# Patient Record
Sex: FEMALE | Race: OTHER | Employment: FULL TIME | URBAN - METROPOLITAN AREA
[De-identification: names, ages, dates, MRNs, and addresses within clinical notes are randomized per-mention and may not be internally consistent; named-entity substitution may affect disease eponyms.]

---

## 2017-05-30 ENCOUNTER — TELEPHONE (OUTPATIENT)
Dept: OBGYN CLINIC | Facility: CLINIC | Age: 37
End: 2017-05-30

## 2017-06-14 ENCOUNTER — OFFICE VISIT (OUTPATIENT)
Dept: OBGYN CLINIC | Facility: CLINIC | Age: 37
End: 2017-06-14

## 2017-06-14 ENCOUNTER — NURSE ONLY (OUTPATIENT)
Dept: OBGYN CLINIC | Facility: CLINIC | Age: 37
End: 2017-06-14

## 2017-06-14 ENCOUNTER — TELEPHONE (OUTPATIENT)
Dept: OBGYN CLINIC | Facility: CLINIC | Age: 37
End: 2017-06-14

## 2017-06-14 ENCOUNTER — APPOINTMENT (OUTPATIENT)
Dept: LAB | Facility: HOSPITAL | Age: 37
End: 2017-06-14
Attending: OBSTETRICS & GYNECOLOGY
Payer: COMMERCIAL

## 2017-06-14 VITALS
WEIGHT: 133.5 LBS | BODY MASS INDEX: 22.79 KG/M2 | HEART RATE: 76 BPM | SYSTOLIC BLOOD PRESSURE: 114 MMHG | HEIGHT: 64 IN | DIASTOLIC BLOOD PRESSURE: 77 MMHG

## 2017-06-14 VITALS — DIASTOLIC BLOOD PRESSURE: 72 MMHG | SYSTOLIC BLOOD PRESSURE: 114 MMHG

## 2017-06-14 DIAGNOSIS — O20.0 THREATENED ABORTION: ICD-10-CM

## 2017-06-14 DIAGNOSIS — N93.9 VAGINAL SPOTTING: ICD-10-CM

## 2017-06-14 DIAGNOSIS — Z67.91 RH NEGATIVE STATE IN ANTEPARTUM PERIOD, FIRST TRIMESTER: ICD-10-CM

## 2017-06-14 DIAGNOSIS — N89.8 VAGINAL ITCHING: Primary | ICD-10-CM

## 2017-06-14 DIAGNOSIS — O20.0 THREATENED ABORTION: Primary | ICD-10-CM

## 2017-06-14 DIAGNOSIS — O26.891 RH NEGATIVE STATE IN ANTEPARTUM PERIOD, FIRST TRIMESTER: ICD-10-CM

## 2017-06-14 DIAGNOSIS — N92.6 MISSED MENSES: ICD-10-CM

## 2017-06-14 DIAGNOSIS — N92.6 MISSED MENSES: Primary | ICD-10-CM

## 2017-06-14 PROBLEM — O26.899 RH NEGATIVE STATE IN ANTEPARTUM PERIOD (HCC): Status: ACTIVE | Noted: 2017-06-14

## 2017-06-14 PROBLEM — O26.899 RH NEGATIVE STATE IN ANTEPARTUM PERIOD: Status: ACTIVE | Noted: 2017-06-14

## 2017-06-14 PROCEDURE — 96372 THER/PROPH/DIAG INJ SC/IM: CPT | Performed by: OBSTETRICS & GYNECOLOGY

## 2017-06-14 PROCEDURE — 36415 COLL VENOUS BLD VENIPUNCTURE: CPT

## 2017-06-14 PROCEDURE — 86901 BLOOD TYPING SEROLOGIC RH(D): CPT

## 2017-06-14 PROCEDURE — 86850 RBC ANTIBODY SCREEN: CPT

## 2017-06-14 PROCEDURE — 86900 BLOOD TYPING SEROLOGIC ABO: CPT

## 2017-06-14 PROCEDURE — 99213 OFFICE O/P EST LOW 20 MIN: CPT | Performed by: ADVANCED PRACTICE MIDWIFE

## 2017-06-14 PROCEDURE — 99204 OFFICE O/P NEW MOD 45 MIN: CPT | Performed by: OBSTETRICS & GYNECOLOGY

## 2017-06-14 RX ORDER — CHOLECALCIFEROL (VITAMIN D3) 1250 MCG
CAPSULE ORAL
Refills: 3 | COMMUNITY
Start: 2017-04-24 | End: 2017-09-28

## 2017-06-14 RX ORDER — FOLIC ACID 1 MG/1
TABLET ORAL
Refills: 10 | COMMUNITY
Start: 2017-04-24 | End: 2019-07-02 | Stop reason: ALTCHOICE

## 2017-06-14 NOTE — PROGRESS NOTES
S.  Had some bleeding in early pregnancy. Was seen earlier by Dr. Khushboo Du and forgot to tell him she has a vaginal itch  O.  Dark brownish red small amount d/c  A. Vaginal itch  P.  Vaginal culture  Not to put anything in vagina at this time.   Can use m

## 2017-06-14 NOTE — TELEPHONE ENCOUNTER
Pt needs to schedule Nurse Ed appt in 1 week. No afternoon appts open for next week for Nurse ed.  Pt states she cannot come in any time earlier than 3pm.

## 2017-06-14 NOTE — PROGRESS NOTES
HPI:   Marilin Ruiz is a 39year old female who presents for amenorrhea/missed menses/pcv/vaginal spotting yesterday, red, today just scant brown. Counseled on threatend ab, check u/s. Rh negative per pt, counseled, rhophylac w/u today. tenderness  :declined pelvic exam by pt.      MUSCULOSKELETAL: back is not tender,FROM of the back  EXTREMITIES: no cyanosis, clubbing or edema  NEURO: Oriented times three,    ASSESSMENT AND PLAN:   Choco Obrien is a 39year old female who p

## 2017-06-14 NOTE — PATIENT INSTRUCTIONS
If You Are Rh Negative – Non Routine Administration  If you’re Rh negative, ask your health care provider about getting treated with RhoGam or Rhophylac. Even if you miscarry or don’t deliver the baby, you will still need treatment.  The health of any bab o Blood test to check for blood type and Rh factor at an [address] [facility] (Diagn[facility] or [facility])  o RhoGam or Rhophylac injection same day as directed by your provider    Location, date and time:  [facility] G[place] 06/14/2017 at 3:50p

## 2017-06-15 ENCOUNTER — HOSPITAL ENCOUNTER (OUTPATIENT)
Dept: ULTRASOUND IMAGING | Facility: HOSPITAL | Age: 37
Discharge: HOME | End: 2017-06-15
Attending: OBSTETRICS & GYNECOLOGY
Payer: COMMERCIAL

## 2017-06-15 DIAGNOSIS — N92.6 MISSED MENSES: ICD-10-CM

## 2017-06-15 DIAGNOSIS — N93.9 VAGINAL SPOTTING: ICD-10-CM

## 2017-06-15 PROCEDURE — 76801 OB US < 14 WKS SINGLE FETUS: CPT | Performed by: OBSTETRICS & GYNECOLOGY

## 2017-06-22 ENCOUNTER — NURSE ONLY (OUTPATIENT)
Dept: OBGYN CLINIC | Facility: CLINIC | Age: 37
End: 2017-06-22

## 2017-06-22 DIAGNOSIS — Z34.81 OTHER NORMAL PREGNANCY, NOT FIRST, FIRST TRIMESTER: Primary | ICD-10-CM

## 2017-06-22 RX ORDER — PRENATAL VIT/IRON FUM/FOLIC AC 27MG-0.8MG
1 TABLET ORAL DAILY
COMMUNITY
End: 2019-07-02 | Stop reason: ALTCHOICE

## 2017-06-22 NOTE — PROGRESS NOTES
Pt and spouce here today for OB RN  Education visit. Pt currently 8w4d by LMP. Educational material reviewed including diet, exercise, travel, precautions. Pt verbalized understanding. PN labs ordered. Undecided on genetic testing to contact office.    32

## 2017-07-03 ENCOUNTER — TELEPHONE (OUTPATIENT)
Dept: OBGYN CLINIC | Facility: CLINIC | Age: 37
End: 2017-07-03

## 2017-07-03 NOTE — TELEPHONE ENCOUNTER
Message   Received: 4 days ago   2 Francis Calvert MD  [facility]Gyne Clinical Staff             edc 1/23/18 by u/s

## 2017-07-15 LAB
ABSOLUTE BASOPHILS: 26 CELLS/UL (ref 0–200)
ABSOLUTE EOSINOPHILS: 43 CELLS/UL (ref 15–500)
ABSOLUTE LYMPHOCYTES: 1677 CELLS/UL (ref 850–3900)
ABSOLUTE MONOCYTES: 482 CELLS/UL (ref 200–950)
ABSOLUTE NEUTROPHILS: 6373 CELLS/UL (ref 1500–7800)
ANTIBODY SCREEN, RBC W/REFL ID, TITER AND AG: POSITIVE
BASOPHILS: 0.3 %
EOSINOPHILS: 0.5 %
GLUCOSE, GESTATIONAL SCREEN (50G)-130 CUTOFF: 111 MG/DL
HEMATOCRIT: 38.6 % (ref 35–45)
HEMOGLOBIN: 13.2 G/DL (ref 11.7–15.5)
LYMPHOCYTES: 19.5 %
MCH: 29.7 PG (ref 27–33)
MCHC: 34.2 G/DL (ref 32–36)
MCV: 86.9 FL (ref 80–100)
MONOCYTES: 5.6 %
MPV: 11.4 FL (ref 7.5–12.5)
NEUTROPHILS: 74.1 %
PLATELET COUNT: 289 THOUSAND/UL (ref 140–400)
RDW: 12.2 % (ref 11–15)
RED BLOOD CELL COUNT: 4.44 MILLION/UL (ref 3.8–5.1)
RUBELLA ANTIBODY (IGG): 3.81 INDEX
WHITE BLOOD CELL COUNT: 8.6 THOUSAND/UL (ref 3.8–10.8)

## 2017-07-27 ENCOUNTER — INITIAL PRENATAL (OUTPATIENT)
Dept: OBGYN CLINIC | Facility: CLINIC | Age: 37
End: 2017-07-27

## 2017-07-27 VITALS
DIASTOLIC BLOOD PRESSURE: 67 MMHG | SYSTOLIC BLOOD PRESSURE: 102 MMHG | WEIGHT: 139.19 LBS | BODY MASS INDEX: 24 KG/M2 | HEART RATE: 85 BPM

## 2017-07-27 DIAGNOSIS — Z34.81 ENCOUNTER FOR SUPERVISION OF OTHER NORMAL PREGNANCY IN FIRST TRIMESTER: Primary | ICD-10-CM

## 2017-07-27 LAB
APPEARANCE: CLEAR
MULTISTIX LOT#: NORMAL NUMERIC
PH, URINE: 6 (ref 4.5–8)
SPECIFIC GRAVITY: 1.03 (ref 1–1.03)
URINE-COLOR: YELLOW
UROBILINOGEN,SEMI-QN: 0.2 MG/DL (ref 0–1.9)

## 2017-08-23 ENCOUNTER — ROUTINE PRENATAL (OUTPATIENT)
Dept: OBGYN CLINIC | Facility: CLINIC | Age: 37
End: 2017-08-23

## 2017-08-23 ENCOUNTER — TELEPHONE (OUTPATIENT)
Dept: OBGYN CLINIC | Facility: CLINIC | Age: 37
End: 2017-08-23

## 2017-08-23 VITALS — SYSTOLIC BLOOD PRESSURE: 122 MMHG | DIASTOLIC BLOOD PRESSURE: 70 MMHG | BODY MASS INDEX: 24 KG/M2 | WEIGHT: 141.38 LBS

## 2017-08-23 DIAGNOSIS — Z34.82 ENCOUNTER FOR SUPERVISION OF OTHER NORMAL PREGNANCY IN SECOND TRIMESTER: Primary | ICD-10-CM

## 2017-08-23 PROBLEM — O09.529 AMA (ADVANCED MATERNAL AGE) MULTIGRAVIDA 35+: Status: ACTIVE | Noted: 2017-08-23

## 2017-08-23 PROBLEM — O09.529 AMA (ADVANCED MATERNAL AGE) MULTIGRAVIDA 35+ (HCC): Status: ACTIVE | Noted: 2017-08-23

## 2017-08-23 LAB
APPEARANCE: CLEAR
MULTISTIX LOT#: NORMAL NUMERIC
PH, URINE: 6 (ref 4.5–8)
SPECIFIC GRAVITY: 1.02 (ref 1–1.03)
URINE-COLOR: YELLOW
UROBILINOGEN,SEMI-QN: 0.2 MG/DL (ref 0–1.9)

## 2017-08-23 NOTE — PROGRESS NOTES
No complaints. Has felt very little fetal movement as of yet. Denies abdominal pain. Is eating and drinking better.   We will be calling to schedule her level 2 ultrasound with [facility] for 20 weeks  Declines genetic testing

## 2017-08-23 NOTE — TELEPHONE ENCOUNTER
Please see that patient has orders for level 2 ultrasound for AMA with MFM for 20 weeks. Patient says she has not been called and she will be calling soon.

## 2017-09-08 ENCOUNTER — HOSPITAL ENCOUNTER (OUTPATIENT)
Dept: PERINATAL CARE | Facility: HOSPITAL | Age: 37
Discharge: HOME | End: 2017-09-08
Attending: OBSTETRICS & GYNECOLOGY
Payer: COMMERCIAL

## 2017-09-08 VITALS
HEART RATE: 81 BPM | SYSTOLIC BLOOD PRESSURE: 125 MMHG | RESPIRATION RATE: 16 BRPM | WEIGHT: 141 LBS | HEIGHT: 64 IN | BODY MASS INDEX: 24.07 KG/M2 | DIASTOLIC BLOOD PRESSURE: 60 MMHG

## 2017-09-08 VITALS — HEART RATE: 81 BPM | SYSTOLIC BLOOD PRESSURE: 125 MMHG | DIASTOLIC BLOOD PRESSURE: 60 MMHG

## 2017-09-08 DIAGNOSIS — Z36.3 SCREENING, ANTENATAL, FOR MALFORMATION BY ULTRASOUND: ICD-10-CM

## 2017-09-08 DIAGNOSIS — O09.522 ELDERLY MULTIGRAVIDA IN SECOND TRIMESTER: ICD-10-CM

## 2017-09-08 DIAGNOSIS — O09.522 ELDERLY MULTIGRAVIDA IN SECOND TRIMESTER: Primary | ICD-10-CM

## 2017-09-08 PROCEDURE — 99243 OFF/OP CNSLTJ NEW/EST LOW 30: CPT | Performed by: OBSTETRICS & GYNECOLOGY

## 2017-09-08 PROCEDURE — 76811 OB US DETAILED SNGL FETUS: CPT | Performed by: OBSTETRICS & GYNECOLOGY

## 2017-09-08 NOTE — PROGRESS NOTES
Outpatient Maternal-Fetal Medicine Consultation    Dear Dr. Salima Krishnamurthy,    Thank you for requesting ultrasound evaluation and maternal fetal medicine consultation on your patient Gianna Tan.   As you are aware she is a 39year old female with a Si General: alert and oriented,no acute distress  Abdomen: gravid, soft, non-tender  Extremities: non-tender, no edema      OBSTETRIC ULTRASOUND  A level II ultrasound was performed prior to the consultation which I interpreted and discussed with the patien percent in women over age 36 and is as high as 28 percent in women over age 48. The incidence of gestational diabetes in the general obstetric population is 3 percent, rising to 7 to 15 percent in women over age 36 and 21 percent in women over age 48.   Anurag Valenzuela an ultrasound examination cannot reliably exclude potential genetic abnormalities.  Given that the patient will be 40years old at the time of delivery I reviewed that her risk (at amniocentesis) of having a fetus with any chromosome abnormality is 1:80 and minutes.

## 2017-09-08 NOTE — PROGRESS NOTES
Pt here for Level II ultrasound  AMA  Hx of depression  Denies pregnancy complaints and states feeling fetal movement

## 2017-09-23 ENCOUNTER — ROUTINE PRENATAL (OUTPATIENT)
Dept: OBGYN CLINIC | Facility: CLINIC | Age: 37
End: 2017-09-23

## 2017-09-23 VITALS — WEIGHT: 145 LBS | BODY MASS INDEX: 25 KG/M2 | DIASTOLIC BLOOD PRESSURE: 72 MMHG | SYSTOLIC BLOOD PRESSURE: 112 MMHG

## 2017-09-23 DIAGNOSIS — Z34.82 ENCOUNTER FOR SUPERVISION OF OTHER NORMAL PREGNANCY IN SECOND TRIMESTER: Primary | ICD-10-CM

## 2017-09-23 LAB
APPEARANCE: CLEAR
BILIRUBIN: NEGATIVE
GLUCOSE (URINE DIPSTICK): NEGATIVE MG/DL
KETONES (URINE DIPSTICK): NEGATIVE MG/DL
LEUKOCYTES: NEGATIVE
MULTISTIX LOT#: NORMAL NUMERIC
NITRITE, URINE: NEGATIVE
OCCULT BLOOD: NEGATIVE
PH, URINE: 6 (ref 4.5–8)
PROTEIN (URINE DIPSTICK): NEGATIVE MG/DL
SPECIFIC GRAVITY: 1.01 (ref 1–1.03)
URINE-COLOR: YELLOW
UROBILINOGEN,SEMI-QN: 0 MG/DL (ref 0–1.9)

## 2017-09-28 ENCOUNTER — TELEPHONE (OUTPATIENT)
Dept: OBGYN CLINIC | Facility: CLINIC | Age: 37
End: 2017-09-28

## 2017-09-28 ENCOUNTER — ROUTINE PRENATAL (OUTPATIENT)
Dept: OBGYN CLINIC | Facility: CLINIC | Age: 37
End: 2017-09-28

## 2017-09-28 VITALS
BODY MASS INDEX: 25 KG/M2 | HEART RATE: 84 BPM | SYSTOLIC BLOOD PRESSURE: 117 MMHG | WEIGHT: 147 LBS | DIASTOLIC BLOOD PRESSURE: 70 MMHG

## 2017-09-28 DIAGNOSIS — Z67.91 RH NEGATIVE STATE IN ANTEPARTUM PERIOD: ICD-10-CM

## 2017-09-28 DIAGNOSIS — O09.529 SUPERVISION OF ELDERLY MULTIGRAVIDA, UNSPECIFIED TRIMESTER: Primary | ICD-10-CM

## 2017-09-28 DIAGNOSIS — O26.899 RH NEGATIVE STATE IN ANTEPARTUM PERIOD: ICD-10-CM

## 2017-09-28 DIAGNOSIS — Z34.82 ENCOUNTER FOR SUPERVISION OF OTHER NORMAL PREGNANCY IN SECOND TRIMESTER: ICD-10-CM

## 2017-09-28 LAB
MULTISTIX LOT#: NORMAL NUMERIC
PH, URINE: 6 (ref 4.5–8)
SPECIFIC GRAVITY: 1.01 (ref 1–1.03)
UROBILINOGEN,SEMI-QN: 0 MG/DL (ref 0–1.9)

## 2017-09-28 NOTE — PROGRESS NOTES
[facility]  Obstetrics and Gynecology  Prenatal Visit  Jimmy Lamar MD    KAREN Sarkar is a 39year old.o.  23w2d weeks. Here for routine prenatal visit and is without complaints.   Patient denies any regular uterine contra are probably not solely related to being pregnant.   I recommended that either she go to the emergency department for evaluation if she feels that she has significant dizziness that impacts her normal daily function or she follow up as soon as possible with

## 2017-09-28 NOTE — TELEPHONE ENCOUNTER
Patient  ga 23, having  Dizzy spells since yesterday with occasional headaches, no blurrie vision, good FM, patient denies any cramping or pain.  States she drinks lots of water, no history of high b/p

## 2017-09-28 NOTE — TELEPHONE ENCOUNTER
Per AJB appointment made with JF to be evaluated, today at 1:10pm at [facility]

## 2017-10-19 ENCOUNTER — ROUTINE PRENATAL (OUTPATIENT)
Dept: OBGYN CLINIC | Facility: CLINIC | Age: 37
End: 2017-10-19

## 2017-10-19 ENCOUNTER — TELEPHONE (OUTPATIENT)
Dept: OBGYN CLINIC | Facility: CLINIC | Age: 37
End: 2017-10-19

## 2017-10-19 VITALS
BODY MASS INDEX: 26 KG/M2 | WEIGHT: 154 LBS | SYSTOLIC BLOOD PRESSURE: 119 MMHG | DIASTOLIC BLOOD PRESSURE: 70 MMHG | HEART RATE: 92 BPM

## 2017-10-19 DIAGNOSIS — Z67.91 RH NEGATIVE STATE IN ANTEPARTUM PERIOD: ICD-10-CM

## 2017-10-19 DIAGNOSIS — O26.899 RH NEGATIVE STATE IN ANTEPARTUM PERIOD: ICD-10-CM

## 2017-10-19 DIAGNOSIS — O09.529 SUPERVISION OF ELDERLY MULTIGRAVIDA, UNSPECIFIED TRIMESTER: Primary | ICD-10-CM

## 2017-10-19 DIAGNOSIS — Z34.82 ENCOUNTER FOR SUPERVISION OF OTHER NORMAL PREGNANCY IN SECOND TRIMESTER: ICD-10-CM

## 2017-10-19 PROBLEM — Z20.821 ZIKA VIRUS EXPOSURE AFFECTING PREGNANCY: Status: ACTIVE | Noted: 2017-10-19

## 2017-10-19 PROBLEM — Z20.821 ZIKA VIRUS EXPOSURE AFFECTING PREGNANCY (HCC): Status: ACTIVE | Noted: 2017-10-19

## 2017-10-19 PROBLEM — O99.891 ZIKA VIRUS EXPOSURE AFFECTING PREGNANCY: Status: ACTIVE | Noted: 2017-10-19

## 2017-10-19 PROBLEM — O99.891 ZIKA VIRUS EXPOSURE AFFECTING PREGNANCY (HCC): Status: ACTIVE | Noted: 2017-10-19

## 2017-10-19 NOTE — PROGRESS NOTES
After discussing flu vaccine with her  and prior to nurse giving patient vaccine and she changed her mind. She declined flu vaccine and understands our recommendations during pregnancy.

## 2017-10-19 NOTE — TELEPHONE ENCOUNTER
10/19/17 Pr Dr. Adam Noriega pt needs Rhogam shot in 2wks. Pt needs to scheduled.         Please Advs. RN

## 2017-10-19 NOTE — PROGRESS NOTES
[facility]  Obstetrics and Gynecology  Prenatal Visit  Jimmy Lamar MD    KAREN Sarkar is a 39year old.o.  26w2d weeks. Here for routine prenatal visit and is without complaints.   Patient denies any regular uterine contra Podosi. I discussed risk of certain birth defects with Zika virus during pregnancy. Neither patient nor her  experience any illness during or after their trip.   I encouraged him to use condoms for prevention of transmission from  to patient

## 2017-11-01 ENCOUNTER — TELEPHONE (OUTPATIENT)
Dept: OBGYN CLINIC | Facility: CLINIC | Age: 37
End: 2017-11-01

## 2017-11-01 DIAGNOSIS — R73.09 ABNORMAL GTT (GLUCOSE TOLERANCE TEST): Primary | ICD-10-CM

## 2017-11-01 RX ORDER — FERROUS SULFATE 325(65) MG
325 TABLET ORAL
Qty: 30 TABLET | Refills: 3 | OUTPATIENT
Start: 2017-11-01 | End: 2019-07-02 | Stop reason: ALTCHOICE

## 2017-11-01 NOTE — TELEPHONE ENCOUNTER
Pt informed of elevated glucose, mild anemia and need to do take iron and do 3 hr gtt. Central scheduling number provided. Called iron in to patient's preferred, no receipt of medication obtained.

## 2017-11-01 NOTE — TELEPHONE ENCOUNTER
----- Message from Stella Ortiz MD sent at 11/1/2017 11:36 AM CDT -----  Please notify patient of elevated 50 gram glucola and schedule her for a 3 hour GTT screen. In addition, please notify patient of mild anemia.   She should begin ferrous sulfate

## 2017-11-03 ENCOUNTER — LAB ENCOUNTER (OUTPATIENT)
Dept: LAB | Age: 37
End: 2017-11-03
Attending: OBSTETRICS & GYNECOLOGY
Payer: COMMERCIAL

## 2017-11-03 DIAGNOSIS — R73.09 ABNORMAL GTT (GLUCOSE TOLERANCE TEST): ICD-10-CM

## 2017-11-03 PROCEDURE — 36415 COLL VENOUS BLD VENIPUNCTURE: CPT | Performed by: OBSTETRICS & GYNECOLOGY

## 2017-11-03 PROCEDURE — 86901 BLOOD TYPING SEROLOGIC RH(D): CPT | Performed by: OBSTETRICS & GYNECOLOGY

## 2017-11-03 PROCEDURE — 85027 COMPLETE CBC AUTOMATED: CPT | Performed by: OBSTETRICS & GYNECOLOGY

## 2017-11-03 PROCEDURE — 86900 BLOOD TYPING SEROLOGIC ABO: CPT | Performed by: OBSTETRICS & GYNECOLOGY

## 2017-11-03 PROCEDURE — 86850 RBC ANTIBODY SCREEN: CPT | Performed by: OBSTETRICS & GYNECOLOGY

## 2017-11-03 PROCEDURE — 82952 GTT-ADDED SAMPLES: CPT

## 2017-11-03 PROCEDURE — 85007 BL SMEAR W/DIFF WBC COUNT: CPT | Performed by: OBSTETRICS & GYNECOLOGY

## 2017-11-03 PROCEDURE — 36415 COLL VENOUS BLD VENIPUNCTURE: CPT

## 2017-11-03 PROCEDURE — 85025 COMPLETE CBC W/AUTO DIFF WBC: CPT | Performed by: OBSTETRICS & GYNECOLOGY

## 2017-11-03 PROCEDURE — 82951 GLUCOSE TOLERANCE TEST (GTT): CPT

## 2017-11-07 ENCOUNTER — ROUTINE PRENATAL (OUTPATIENT)
Dept: OBGYN CLINIC | Facility: CLINIC | Age: 37
End: 2017-11-07

## 2017-11-07 ENCOUNTER — TELEPHONE (OUTPATIENT)
Dept: OBGYN CLINIC | Facility: CLINIC | Age: 37
End: 2017-11-07

## 2017-11-07 VITALS
DIASTOLIC BLOOD PRESSURE: 70 MMHG | WEIGHT: 156.63 LBS | SYSTOLIC BLOOD PRESSURE: 104 MMHG | BODY MASS INDEX: 27 KG/M2 | HEART RATE: 85 BPM

## 2017-11-07 DIAGNOSIS — Z23 NEED FOR VACCINATION: ICD-10-CM

## 2017-11-07 DIAGNOSIS — B34.9 VIRAL SYNDROME: Primary | ICD-10-CM

## 2017-11-07 DIAGNOSIS — O26.893 RH NEGATIVE STATE IN ANTEPARTUM PERIOD, THIRD TRIMESTER: ICD-10-CM

## 2017-11-07 DIAGNOSIS — Z67.91 RH NEGATIVE STATE IN ANTEPARTUM PERIOD, THIRD TRIMESTER: ICD-10-CM

## 2017-11-07 DIAGNOSIS — Z34.83 ENCOUNTER FOR SUPERVISION OF OTHER NORMAL PREGNANCY IN THIRD TRIMESTER: Primary | ICD-10-CM

## 2017-11-07 PROCEDURE — 90686 IIV4 VACC NO PRSV 0.5 ML IM: CPT | Performed by: ADVANCED PRACTICE MIDWIFE

## 2017-11-07 PROCEDURE — 96372 THER/PROPH/DIAG INJ SC/IM: CPT | Performed by: ADVANCED PRACTICE MIDWIFE

## 2017-11-07 PROCEDURE — 90471 IMMUNIZATION ADMIN: CPT | Performed by: ADVANCED PRACTICE MIDWIFE

## 2017-11-07 NOTE — PATIENT INSTRUCTIONS
If You Are Rh Negative – Routine Administration    If you’re Rh negative, ask your health care provider about getting treated with RhoGam or Rhophylac. Even if you miscarry or don’t deliver the baby, you will still need treatment.  The health of any baby as directed      Location, date and time:

## 2017-11-07 NOTE — PROGRESS NOTES
S.  Denies ABBASI, vision change, URQ pain, swelling. Baby is active. Its a girl. EDPS. Traveled to [facility] in October. Reports one mosquito bite but no sx of zika. Supervisor at work, [employer].   O.  See above  A.   S = D 29 weeks  Kieran Marcano

## 2017-11-08 NOTE — PROGRESS NOTES
Rhogam 1500 iu IM administered to pt in the left upper gluteus. Flulaval 0.5 ml IM adm to pt in the left deltoid. Pt tolerated both injections well with no adverse effects.

## 2017-11-14 ENCOUNTER — TELEPHONE (OUTPATIENT)
Dept: PEDIATRICS CLINIC | Facility: CLINIC | Age: 37
End: 2017-11-14

## 2017-11-15 ENCOUNTER — OFFICE VISIT (OUTPATIENT)
Dept: OBGYN CLINIC | Facility: CLINIC | Age: 37
End: 2017-11-15

## 2017-11-15 VITALS
DIASTOLIC BLOOD PRESSURE: 62 MMHG | SYSTOLIC BLOOD PRESSURE: 108 MMHG | WEIGHT: 158 LBS | TEMPERATURE: 98 F | BODY MASS INDEX: 27 KG/M2

## 2017-11-15 DIAGNOSIS — R05.9 COUGH: ICD-10-CM

## 2017-11-15 DIAGNOSIS — J02.9 SORE THROAT: Primary | ICD-10-CM

## 2017-11-15 DIAGNOSIS — J02.9 PHARYNGITIS, UNSPECIFIED ETIOLOGY: ICD-10-CM

## 2017-11-15 PROCEDURE — 99214 OFFICE O/P EST MOD 30 MIN: CPT | Performed by: OBSTETRICS & GYNECOLOGY

## 2017-11-15 NOTE — TELEPHONE ENCOUNTER
Pt voices since yesterday she has had a sore throat. No fever. Positive fetal movement and no contractions or pain. Pt voices she has some nasal congestion.  Informed pt to review sheet of acceptable medications to take in pregnancy to see if there is anyth

## 2017-11-18 NOTE — PROGRESS NOTES
HPI:   Lyndsay Lopez is a 39year old female who presents for a hx of sore throat for 3 days, pt requested strep testing.        Wt Readings from Last 6 Encounters:  11/15/17 : 158 lb (71.7 kg)  11/07/17 : 156 lb 9.6 oz (71 kg)  10/19/17 : 154 lb developed, well nourished,in no apparent distress  SKIN: no rashes,no suspicious lesions  HEENT: atraumatic, normocephalic,ears and throat are clear with very mild erythema oropharynx, no plaques.     NECK: supple,no adenopathy  CHEST: no chest tenderness

## 2017-11-20 ENCOUNTER — TELEPHONE (OUTPATIENT)
Dept: OBGYN CLINIC | Facility: CLINIC | Age: 37
End: 2017-11-20

## 2017-11-20 ENCOUNTER — LAB ENCOUNTER (OUTPATIENT)
Dept: LAB | Age: 37
End: 2017-11-20
Attending: ADVANCED PRACTICE MIDWIFE
Payer: COMMERCIAL

## 2017-11-20 ENCOUNTER — ROUTINE PRENATAL (OUTPATIENT)
Dept: OBGYN CLINIC | Facility: CLINIC | Age: 37
End: 2017-11-20

## 2017-11-20 VITALS — BODY MASS INDEX: 27 KG/M2 | WEIGHT: 159 LBS | DIASTOLIC BLOOD PRESSURE: 60 MMHG | SYSTOLIC BLOOD PRESSURE: 102 MMHG

## 2017-11-20 DIAGNOSIS — B34.9 VIRAL SYNDROME: ICD-10-CM

## 2017-11-20 DIAGNOSIS — Z34.83 ENCOUNTER FOR SUPERVISION OF OTHER NORMAL PREGNANCY IN THIRD TRIMESTER: ICD-10-CM

## 2017-11-20 DIAGNOSIS — Z23 NEED FOR VACCINATION: ICD-10-CM

## 2017-11-20 DIAGNOSIS — Z34.83 ENCOUNTER FOR SUPERVISION OF OTHER NORMAL PREGNANCY IN THIRD TRIMESTER: Primary | ICD-10-CM

## 2017-11-20 PROCEDURE — 86900 BLOOD TYPING SEROLOGIC ABO: CPT

## 2017-11-20 PROCEDURE — 86900 BLOOD TYPING SEROLOGIC ABO: CPT | Performed by: ADVANCED PRACTICE MIDWIFE

## 2017-11-20 PROCEDURE — 86901 BLOOD TYPING SEROLOGIC RH(D): CPT | Performed by: ADVANCED PRACTICE MIDWIFE

## 2017-11-20 PROCEDURE — 86901 BLOOD TYPING SEROLOGIC RH(D): CPT

## 2017-11-20 PROCEDURE — 36415 COLL VENOUS BLD VENIPUNCTURE: CPT | Performed by: ADVANCED PRACTICE MIDWIFE

## 2017-11-20 NOTE — TELEPHONE ENCOUNTER
Pt dropped off FMLA forms at the time of visit, HIPPA & FCR forms completed. Unable to take pymnt due to error w/ CC machine. Forms scanned to [employer] and copy left in the bin for Kori @ [facility].

## 2017-11-20 NOTE — TELEPHONE ENCOUNTER
[phone] (home) [phone] (work)  Pt informed she can go for [facility] testing, order placed in system.

## 2017-11-21 NOTE — TELEPHONE ENCOUNTER
Received in [facility] form + FCR+ Signed release, pt aware of fee, will bill. Logged for processing.  NK

## 2017-11-22 ENCOUNTER — TELEPHONE (OUTPATIENT)
Dept: OBGYN CLINIC | Facility: CLINIC | Age: 37
End: 2017-11-22

## 2017-11-22 ENCOUNTER — HOSPITAL ENCOUNTER (OUTPATIENT)
Facility: HOSPITAL | Age: 37
Setting detail: OBSERVATION
Discharge: HOME | End: 2017-11-22
Attending: OBSTETRICS & GYNECOLOGY | Admitting: OBSTETRICS & GYNECOLOGY
Payer: COMMERCIAL

## 2017-11-22 VITALS — DIASTOLIC BLOOD PRESSURE: 58 MMHG | SYSTOLIC BLOOD PRESSURE: 110 MMHG | HEART RATE: 86 BPM

## 2017-11-22 PROCEDURE — 99212 OFFICE O/P EST SF 10 MIN: CPT | Performed by: OBSTETRICS & GYNECOLOGY

## 2017-11-22 PROCEDURE — 59025 FETAL NON-STRESS TEST: CPT | Performed by: OBSTETRICS & GYNECOLOGY

## 2017-11-22 NOTE — TRIAGE
[facility]      Triage Note    Gianna Manuel Patient Status:  Observation    1980 MRN [id number]   Location [address]  Attending Alverto Garcia MD   Hosp Day # 0 PCP Daquan Glover MD contractions noted. Dr Benitez Chow at bedside and VE done cervix closed, thick and posterior. Orders recd to discharge to home. Pt given instructions to relax and rest this weekend. Pt instructed on pain management and relaxation.   Pt educated on fetal kick

## 2017-11-22 NOTE — H&P
[facility]    Triage Observation History & Physical    Gianna Sanford Che Patient Status:  Observation    1980 MRN [id number]   Location [address]  Attending Lucy Pacheco MD   Hosp Day # 0 PCP E documented in HPI        Physical Exam:   Pulse:  [86] 86  BP: (110)/(58) 110/58    Constitutional: alert, appears stated age and cooperative  Respiratory: clear to auscultation bilaterally  Cardiac: regular rate and rhythm, S1, S2 normal, no murmur, click

## 2017-11-22 NOTE — TELEPHONE ENCOUNTER
Per the pt she is 31 weeks pregnant, and having a lot of lower back pain, and stomach tightening. The pt would like to speak with a nurse. Please advise.

## 2017-11-22 NOTE — PROGRESS NOTES
Pt states she has had constant back pain 5/10 since % am this morning. Pt does states she has noticed increase in frequency of urination and some burning with urination at work but states she has a long way to walk to the bathroom at work.   Abd palpates s

## 2017-11-22 NOTE — TELEPHONE ENCOUNTER
Pt 31 weeks c/o mid-lower back pain since 5 am in the morning. Pain feels like aching and Constat rated 5/10. Increased frequency, also has noticed pressure, urgency x1 week  Also c/o intermittent abdominal tightening tight since Monday.  Hasn't paid atten

## 2017-11-28 NOTE — TELEPHONE ENCOUNTER
Dr. Freedom Oneil    Please sign off on form:  -Highlight the patient and hit \"Chart\" button. -In Chart Review, w/in the Encounter tab - open the Telephone call encounter for 11-20-17. Scroll down.  -Click \"scan on\" blue Hyperlink under \"Media\" heading for [email or web address]_ [id number].  -Click on Acknowledge button at the bottom right corner and left-click onto image, signature stamp appears and drag signature to Provider signature line. Stamp will turn blue. Close window.     Thank you,  Arsh Sweeney

## 2017-11-28 NOTE — TELEPHONE ENCOUNTER
FMLA form complete and ready to be printed. Patient informed by phone form is ready. Patient needs to pay form fee of $25. Request complete.

## 2017-12-01 ENCOUNTER — HOSPITAL ENCOUNTER (OUTPATIENT)
Dept: PERINATAL CARE | Facility: HOSPITAL | Age: 37
Discharge: HOME | End: 2017-12-01
Attending: OBSTETRICS & GYNECOLOGY
Payer: COMMERCIAL

## 2017-12-01 DIAGNOSIS — O99.891 ZIKA VIRUS EXPOSURE AFFECTING PREGNANCY: ICD-10-CM

## 2017-12-01 DIAGNOSIS — Z20.821 ZIKA VIRUS EXPOSURE AFFECTING PREGNANCY: ICD-10-CM

## 2017-12-01 DIAGNOSIS — O09.523 ELDERLY MULTIGRAVIDA IN THIRD TRIMESTER: ICD-10-CM

## 2017-12-01 DIAGNOSIS — O09.523 ELDERLY MULTIGRAVIDA IN THIRD TRIMESTER: Primary | ICD-10-CM

## 2017-12-01 PROCEDURE — 99242 OFF/OP CONSLTJ NEW/EST SF 20: CPT | Performed by: OBSTETRICS & GYNECOLOGY

## 2017-12-01 PROCEDURE — 76805 OB US >/= 14 WKS SNGL FETUS: CPT | Performed by: OBSTETRICS & GYNECOLOGY

## 2017-12-01 NOTE — PROGRESS NOTES
Outpatient Maternal-Fetal Medicine Consultation    Dear Dr. Diamond Goldman,    Thank you for requesting ultrasound evaluation and maternal fetal medicine consultation on your patient Gianna Rey Michelle.   As you are aware she is a 40year old female with a Sin The majority of the time (>50%) was spent in review of records, consultation and coordination of care. Our discussion is summarized above. The approximate physician face-to-face time was 15 minutes.

## 2017-12-07 ENCOUNTER — ROUTINE PRENATAL (OUTPATIENT)
Dept: OBGYN CLINIC | Facility: CLINIC | Age: 37
End: 2017-12-07

## 2017-12-07 VITALS
DIASTOLIC BLOOD PRESSURE: 70 MMHG | WEIGHT: 161 LBS | HEART RATE: 89 BPM | BODY MASS INDEX: 28 KG/M2 | SYSTOLIC BLOOD PRESSURE: 106 MMHG

## 2017-12-07 DIAGNOSIS — Z34.83 ENCOUNTER FOR SUPERVISION OF OTHER NORMAL PREGNANCY IN THIRD TRIMESTER: Primary | ICD-10-CM

## 2017-12-07 NOTE — PROGRESS NOTES
Good fm. No c/o. Had [facility] virus test 11/20, results still pending.   Plan nst starting 36 wks

## 2017-12-21 ENCOUNTER — ROUTINE PRENATAL (OUTPATIENT)
Dept: OBGYN CLINIC | Facility: CLINIC | Age: 37
End: 2017-12-21

## 2017-12-21 ENCOUNTER — TELEPHONE (OUTPATIENT)
Dept: OBGYN CLINIC | Facility: CLINIC | Age: 37
End: 2017-12-21

## 2017-12-21 VITALS — SYSTOLIC BLOOD PRESSURE: 112 MMHG | DIASTOLIC BLOOD PRESSURE: 62 MMHG | WEIGHT: 163.81 LBS | BODY MASS INDEX: 28 KG/M2

## 2017-12-21 DIAGNOSIS — Z34.83 ENCOUNTER FOR SUPERVISION OF OTHER NORMAL PREGNANCY IN THIRD TRIMESTER: Primary | ICD-10-CM

## 2017-12-21 PROCEDURE — 59426 ANTEPARTUM CARE ONLY: CPT | Performed by: OBSTETRICS & GYNECOLOGY

## 2018-01-04 ENCOUNTER — TELEPHONE (OUTPATIENT)
Dept: OBGYN CLINIC | Facility: CLINIC | Age: 38
End: 2018-01-04

## 2018-01-04 ENCOUNTER — ROUTINE PRENATAL (OUTPATIENT)
Dept: OBGYN CLINIC | Facility: CLINIC | Age: 38
End: 2018-01-04

## 2018-01-04 VITALS — WEIGHT: 167 LBS | SYSTOLIC BLOOD PRESSURE: 125 MMHG | BODY MASS INDEX: 29 KG/M2 | DIASTOLIC BLOOD PRESSURE: 79 MMHG

## 2018-01-04 DIAGNOSIS — Z34.83 ENCOUNTER FOR SUPERVISION OF OTHER NORMAL PREGNANCY IN THIRD TRIMESTER: Primary | ICD-10-CM

## 2018-01-04 NOTE — TELEPHONE ENCOUNTER
Pt dropped off [facility] paperwork. Company was bought out from another company and they are requesting paperwork again. Did not specify which location would be best for . Paid $25.00 fee today.

## 2018-01-05 ENCOUNTER — HOSPITAL ENCOUNTER (OUTPATIENT)
Facility: HOSPITAL | Age: 38
Discharge: HOME | End: 2018-01-05
Attending: OBSTETRICS & GYNECOLOGY | Admitting: OBSTETRICS & GYNECOLOGY
Payer: COMMERCIAL

## 2018-01-05 ENCOUNTER — HOSPITAL ENCOUNTER (OUTPATIENT)
Dept: OBGYN CLINIC | Facility: HOSPITAL | Age: 38
Discharge: HOME | End: 2018-01-05
Payer: COMMERCIAL

## 2018-01-05 PROBLEM — O09.523 ELDERLY MULTIGRAVIDA IN THIRD TRIMESTER: Status: ACTIVE | Noted: 2017-08-23

## 2018-01-05 PROBLEM — O09.523 ELDERLY MULTIGRAVIDA IN THIRD TRIMESTER (HCC): Status: ACTIVE | Noted: 2017-08-23

## 2018-01-05 PROCEDURE — 59025 FETAL NON-STRESS TEST: CPT | Performed by: OBSTETRICS & GYNECOLOGY

## 2018-01-05 NOTE — NST
Nonstress Test   Patient: Gianna Coronado    Gestation: 37w3d    NST: ama     Variability: Moderate           Accelerations: Yes           Decelerations: None            Baseline: 140 BPM           Uterine Irritability: No           Contractions:

## 2018-01-08 NOTE — TELEPHONE ENCOUNTER
Dr. Tuan Malagon,    Please sign off on form:  -Highlight the patient and hit \"Chart\" button. -In Chart Review, w/in the Encounter tab - open the Telephone call encounter for 11/20/17. Scroll down.  -Click \"scan on\" blue Hyperlink under \"Media\" heading for PPD Disab. Form Dr. Annie Higgins 42/57/77.  -Click on Acknowledge button at the bottom right corner and left-click onto image, signature stamp appears and drag signature to Provider signature line. Stamp will turn blue. Close window.     Thank you,  Edwin Jennings

## 2018-01-08 NOTE — TELEPHONE ENCOUNTER
Pt dropped off [facility] paperwork. Company was bought out from another company and they are requesting paperwork again. Did not specify which location would be best for . Paid $25.00 fee today.  01/04/18 Janina Sutton/ PATRICE

## 2018-01-08 NOTE — TELEPHONE ENCOUNTER
Disability form for [employer] for Dr. Elise Brody received in [facility]. Logged for processing. Payment matched for the [place] form, 2nd charge charge for the disability posted.  NK

## 2018-01-09 ENCOUNTER — TELEPHONE (OUTPATIENT)
Dept: OBGYN CLINIC | Facility: CLINIC | Age: 38
End: 2018-01-09

## 2018-01-09 NOTE — TELEPHONE ENCOUNTER
Disability form faxed to [facility]. Original mailed to patient. Request complete.

## 2018-01-12 ENCOUNTER — HOSPITAL ENCOUNTER (OUTPATIENT)
Dept: OBGYN CLINIC | Facility: HOSPITAL | Age: 38
Discharge: HOME | End: 2018-01-12
Payer: COMMERCIAL

## 2018-01-12 ENCOUNTER — ROUTINE PRENATAL (OUTPATIENT)
Dept: OBGYN CLINIC | Facility: CLINIC | Age: 38
End: 2018-01-12

## 2018-01-12 ENCOUNTER — HOSPITAL ENCOUNTER (OUTPATIENT)
Facility: HOSPITAL | Age: 38
Discharge: HOME | End: 2018-01-12
Attending: OBSTETRICS & GYNECOLOGY | Admitting: OBSTETRICS & GYNECOLOGY
Payer: COMMERCIAL

## 2018-01-12 VITALS
HEART RATE: 85 BPM | DIASTOLIC BLOOD PRESSURE: 74 MMHG | WEIGHT: 171 LBS | BODY MASS INDEX: 29 KG/M2 | SYSTOLIC BLOOD PRESSURE: 125 MMHG

## 2018-01-12 DIAGNOSIS — O09.529 SUPERVISION OF ELDERLY MULTIGRAVIDA, UNSPECIFIED TRIMESTER: Primary | ICD-10-CM

## 2018-01-12 DIAGNOSIS — Z34.83 ENCOUNTER FOR SUPERVISION OF OTHER NORMAL PREGNANCY IN THIRD TRIMESTER: ICD-10-CM

## 2018-01-12 LAB
MULTISTIX LOT#: NORMAL NUMERIC
PH, URINE: 6 (ref 4.5–8)
SPECIFIC GRAVITY: 1.02 (ref 1–1.03)
URINE-COLOR: YELLOW
UROBILINOGEN,SEMI-QN: 0 MG/DL (ref 0–1.9)

## 2018-01-12 PROCEDURE — 59025 FETAL NON-STRESS TEST: CPT | Performed by: OBSTETRICS & GYNECOLOGY

## 2018-01-12 PROCEDURE — 81002 URINALYSIS NONAUTO W/O SCOPE: CPT | Performed by: OBSTETRICS & GYNECOLOGY

## 2018-01-12 NOTE — NST
Nonstress Test   Patient: Gianna Coronado    Gestation: 38w3d    NST:       Variability: Moderate           Accelerations: Yes           Decelerations: None            Baseline: 125 BPM           Uterine Irritability: No           Contractions: Ir

## 2018-01-12 NOTE — PROGRESS NOTES
[facility]  Obstetrics and Gynecology  Prenatal Visit  MD KAREN Wade Lynda Herndon is a 40year old.o.  38w3d weeks. Here for routine prenatal visit and is without complaints.   Patient denies any regular uterine contra

## 2018-01-15 NOTE — TELEPHONE ENCOUNTER
Disability form from [employer] received in [place]. Scanned blank, was completed 01/09. Shredded.  NK

## 2018-01-18 ENCOUNTER — ROUTINE PRENATAL (OUTPATIENT)
Dept: OBGYN CLINIC | Facility: CLINIC | Age: 38
End: 2018-01-18

## 2018-01-18 VITALS — SYSTOLIC BLOOD PRESSURE: 118 MMHG | WEIGHT: 167.63 LBS | DIASTOLIC BLOOD PRESSURE: 62 MMHG | BODY MASS INDEX: 29 KG/M2

## 2018-01-18 DIAGNOSIS — Z34.83 ENCOUNTER FOR SUPERVISION OF OTHER NORMAL PREGNANCY IN THIRD TRIMESTER: Primary | ICD-10-CM

## 2018-01-18 LAB
APPEARANCE: CLEAR
MULTISTIX LOT#: NORMAL NUMERIC
PH, URINE: 6.5 (ref 4.5–8)
SPECIFIC GRAVITY: 1.01 (ref 1–1.03)
URINE-COLOR: YELLOW
UROBILINOGEN,SEMI-QN: 0.2 MG/DL (ref 0–1.9)

## 2018-01-18 PROCEDURE — 81002 URINALYSIS NONAUTO W/O SCOPE: CPT | Performed by: OBSTETRICS & GYNECOLOGY

## 2018-01-25 ENCOUNTER — ROUTINE PRENATAL (OUTPATIENT)
Dept: OBGYN CLINIC | Facility: CLINIC | Age: 38
End: 2018-01-25

## 2018-01-25 ENCOUNTER — HOSPITAL ENCOUNTER (OUTPATIENT)
Facility: HOSPITAL | Age: 38
Setting detail: OBSERVATION
Discharge: HOME | End: 2018-01-25
Attending: OBSTETRICS & GYNECOLOGY | Admitting: OBSTETRICS & GYNECOLOGY
Payer: COMMERCIAL

## 2018-01-25 ENCOUNTER — APPOINTMENT (OUTPATIENT)
Dept: PERINATAL CARE | Facility: HOSPITAL | Age: 38
End: 2018-01-25
Attending: OBSTETRICS & GYNECOLOGY
Payer: COMMERCIAL

## 2018-01-25 VITALS
BODY MASS INDEX: 29 KG/M2 | HEART RATE: 83 BPM | DIASTOLIC BLOOD PRESSURE: 81 MMHG | SYSTOLIC BLOOD PRESSURE: 122 MMHG | WEIGHT: 167.63 LBS

## 2018-01-25 DIAGNOSIS — O48.0 POST-TERM PREGNANCY, 40-42 WEEKS OF GESTATION: Primary | ICD-10-CM

## 2018-01-25 DIAGNOSIS — Z34.83 ENCOUNTER FOR SUPERVISION OF OTHER NORMAL PREGNANCY IN THIRD TRIMESTER: Primary | ICD-10-CM

## 2018-01-25 PROBLEM — O20.0 THREATENED ABORTION: Status: RESOLVED | Noted: 2017-06-14 | Resolved: 2018-01-25

## 2018-01-25 PROBLEM — O20.0 THREATENED ABORTION (HCC): Status: RESOLVED | Noted: 2017-06-14 | Resolved: 2018-01-25

## 2018-01-25 PROCEDURE — 76815 OB US LIMITED FETUS(S): CPT | Performed by: OBSTETRICS & GYNECOLOGY

## 2018-01-25 PROCEDURE — 81002 URINALYSIS NONAUTO W/O SCOPE: CPT | Performed by: OBSTETRICS & GYNECOLOGY

## 2018-01-25 PROCEDURE — 59425 ANTEPARTUM CARE ONLY: CPT | Performed by: OBSTETRICS & GYNECOLOGY

## 2018-01-25 PROCEDURE — 59025 FETAL NON-STRESS TEST: CPT

## 2018-01-25 NOTE — IMAGING NOTE
OB ULTRASOUND REPORT   See imaging tab for complete ultrasound report or in PACS    Fetal Heart Rate: Present 135 bpm  Fetal Presentation: Vertex  Amniotic fluid MVP: 13.9 cm    Placental Location: Anterior      IMPRESSION  1. IUP @  40w2d   2.  Normal AF

## 2018-01-25 NOTE — NST
Nonstress Test   Patient: Gianna Coronado    Gestation: 40w2d    NST:post dates, ama       Variability: Moderate           Accelerations: Yes           Decelerations: None            Baseline: 130 BPM           Uterine Irritability: No           C

## 2018-01-25 NOTE — PROGRESS NOTES
Good fm.  occas u/c. BOWI  Labor/rom prec. FM counting  NST/PAMELA today pdates  IOL 41 wks if undelivered.   1/30

## 2018-01-27 ENCOUNTER — HOSPITAL ENCOUNTER (INPATIENT)
Facility: HOSPITAL | Age: 38
LOS: 2 days | Discharge: HOME | End: 2018-01-29
Attending: OBSTETRICS & GYNECOLOGY | Admitting: OBSTETRICS & GYNECOLOGY
Payer: COMMERCIAL

## 2018-01-27 ENCOUNTER — TELEPHONE (OUTPATIENT)
Dept: OBGYN CLINIC | Facility: CLINIC | Age: 38
End: 2018-01-27

## 2018-01-27 PROBLEM — O48.0 POST TERM PREGNANCY AT 41 WEEKS GESTATION: Status: ACTIVE | Noted: 2018-01-27

## 2018-01-27 PROBLEM — Z3A.41 POST TERM PREGNANCY AT 41 WEEKS GESTATION: Status: ACTIVE | Noted: 2018-01-27

## 2018-01-27 PROBLEM — Z34.90 PREGNANCY: Status: ACTIVE | Noted: 2018-01-27

## 2018-01-27 PROBLEM — Z34.90 PREGNANCY (HCC): Status: ACTIVE | Noted: 2018-01-27

## 2018-01-27 PROBLEM — O48.0 POST TERM PREGNANCY AT 41 WEEKS GESTATION (HCC): Status: ACTIVE | Noted: 2018-01-27

## 2018-01-27 PROBLEM — Z3A.41 POST TERM PREGNANCY AT 41 WEEKS GESTATION (HCC): Status: ACTIVE | Noted: 2018-01-27

## 2018-01-27 LAB
ANTIBODY SCREEN: POSITIVE
DIRECT COOMBS POLY: NEGATIVE
ERYTHROCYTE [DISTWIDTH] IN BLOOD BY AUTOMATED COUNT: 14.4 % (ref 11–15)
FETAL SCREEN RESULT: POSITIVE
HCT VFR BLD AUTO: 40 % (ref 35–48)
HGB BLD-MCNC: 13.4 G/DL (ref 12–16)
MCH RBC QN AUTO: 30.6 PG (ref 27–32)
MCHC RBC AUTO-ENTMCNC: 33.6 G/DL (ref 32–37)
MCV RBC AUTO: 91.1 FL (ref 80–100)
PLATELET # BLD AUTO: 185 K/UL (ref 140–400)
PMV BLD AUTO: 10.4 FL (ref 7.4–10.3)
RBC # BLD AUTO: 4.39 M/UL (ref 3.7–5.4)
RH BLOOD TYPE: NEGATIVE
WBC # BLD AUTO: 11 K/UL (ref 4–11)

## 2018-01-27 PROCEDURE — 99219 INITIAL OBSERVATION CARE,LEVL II: CPT | Performed by: OBSTETRICS & GYNECOLOGY

## 2018-01-27 PROCEDURE — 59410 OBSTETRICAL CARE: CPT | Performed by: OBSTETRICS & GYNECOLOGY

## 2018-01-27 PROCEDURE — 3E0334Z INTRODUCTION OF SERUM, TOXOID AND VACCINE INTO PERIPHERAL VEIN, PERCUTANEOUS APPROACH: ICD-10-PCS | Performed by: OBSTETRICS & GYNECOLOGY

## 2018-01-27 PROCEDURE — 59025 FETAL NON-STRESS TEST: CPT | Performed by: OBSTETRICS & GYNECOLOGY

## 2018-01-27 RX ORDER — DEXTROSE, SODIUM CHLORIDE, SODIUM LACTATE, POTASSIUM CHLORIDE, AND CALCIUM CHLORIDE 5; .6; .31; .03; .02 G/100ML; G/100ML; G/100ML; G/100ML; G/100ML
125 INJECTION, SOLUTION INTRAVENOUS CONTINUOUS
Status: DISCONTINUED | OUTPATIENT
Start: 2018-01-27 | End: 2018-01-27

## 2018-01-27 RX ORDER — TERBUTALINE SULFATE 1 MG/ML
0.25 INJECTION, SOLUTION SUBCUTANEOUS AS NEEDED
Status: DISCONTINUED | OUTPATIENT
Start: 2018-01-27 | End: 2018-01-27 | Stop reason: HOSPADM

## 2018-01-27 RX ORDER — IBUPROFEN 600 MG/1
600 TABLET ORAL ONCE AS NEEDED
Status: DISCONTINUED | OUTPATIENT
Start: 2018-01-27 | End: 2018-01-27

## 2018-01-27 RX ORDER — IBUPROFEN 600 MG/1
600 TABLET ORAL ONCE AS NEEDED
Status: DISCONTINUED | OUTPATIENT
Start: 2018-01-27 | End: 2018-01-27 | Stop reason: HOSPADM

## 2018-01-27 RX ORDER — SODIUM CHLORIDE 0.9 % (FLUSH) 0.9 %
10 SYRINGE (ML) INJECTION AS NEEDED
Status: DISCONTINUED | OUTPATIENT
Start: 2018-01-27 | End: 2018-01-29

## 2018-01-27 RX ORDER — DOCUSATE SODIUM 100 MG/1
100 CAPSULE, LIQUID FILLED ORAL 2 TIMES DAILY
Status: DISCONTINUED | OUTPATIENT
Start: 2018-01-27 | End: 2018-01-29

## 2018-01-27 RX ORDER — ONDANSETRON 2 MG/ML
4 INJECTION INTRAMUSCULAR; INTRAVENOUS EVERY 6 HOURS PRN
Status: DISCONTINUED | OUTPATIENT
Start: 2018-01-27 | End: 2018-01-29

## 2018-01-27 RX ORDER — DEXTROSE, SODIUM CHLORIDE, SODIUM LACTATE, POTASSIUM CHLORIDE, AND CALCIUM CHLORIDE 5; .6; .31; .03; .02 G/100ML; G/100ML; G/100ML; G/100ML; G/100ML
125 INJECTION, SOLUTION INTRAVENOUS CONTINUOUS
Status: DISCONTINUED | OUTPATIENT
Start: 2018-01-27 | End: 2018-01-27 | Stop reason: HOSPADM

## 2018-01-27 RX ORDER — BISACODYL 10 MG
10 SUPPOSITORY, RECTAL RECTAL ONCE AS NEEDED
Status: DISCONTINUED | OUTPATIENT
Start: 2018-01-27 | End: 2018-01-29

## 2018-01-27 RX ORDER — AMMONIA INHALANTS 0.04 G/.3ML
0.3 INHALANT RESPIRATORY (INHALATION) AS NEEDED
Status: DISCONTINUED | OUTPATIENT
Start: 2018-01-27 | End: 2018-01-27

## 2018-01-27 RX ORDER — IBUPROFEN 600 MG/1
600 TABLET ORAL EVERY 6 HOURS PRN
Status: DISCONTINUED | OUTPATIENT
Start: 2018-01-27 | End: 2018-01-29

## 2018-01-27 RX ORDER — TRISODIUM CITRATE DIHYDRATE AND CITRIC ACID MONOHYDRATE 500; 334 MG/5ML; MG/5ML
30 SOLUTION ORAL AS NEEDED
Status: DISCONTINUED | OUTPATIENT
Start: 2018-01-27 | End: 2018-01-27

## 2018-01-27 RX ORDER — TRISODIUM CITRATE DIHYDRATE AND CITRIC ACID MONOHYDRATE 500; 334 MG/5ML; MG/5ML
30 SOLUTION ORAL AS NEEDED
Status: DISCONTINUED | OUTPATIENT
Start: 2018-01-27 | End: 2018-01-27 | Stop reason: HOSPADM

## 2018-01-27 RX ORDER — LIDOCAINE HYDROCHLORIDE 10 MG/ML
30 INJECTION, SOLUTION EPIDURAL; INFILTRATION; INTRACAUDAL; PERINEURAL ONCE
Status: DISCONTINUED | OUTPATIENT
Start: 2018-01-27 | End: 2018-01-27 | Stop reason: HOSPADM

## 2018-01-27 RX ORDER — DIAPER,BRIEF,INFANT-TODD,DISP
1 EACH MISCELLANEOUS EVERY 6 HOURS PRN
Status: DISCONTINUED | OUTPATIENT
Start: 2018-01-27 | End: 2018-01-29

## 2018-01-27 RX ORDER — AMMONIA INHALANTS 0.04 G/.3ML
0.3 INHALANT RESPIRATORY (INHALATION) AS NEEDED
Status: DISCONTINUED | OUTPATIENT
Start: 2018-01-27 | End: 2018-01-29

## 2018-01-27 RX ORDER — PRENATAL VIT,CAL 76/IRON/FOLIC 29 MG-1 MG
1 TABLET ORAL DAILY
Status: DISCONTINUED | OUTPATIENT
Start: 2018-01-27 | End: 2018-01-29

## 2018-01-27 RX ORDER — SODIUM CHLORIDE 0.9 % (FLUSH) 0.9 %
10 SYRINGE (ML) INJECTION AS NEEDED
Status: DISCONTINUED | OUTPATIENT
Start: 2018-01-27 | End: 2018-01-27

## 2018-01-27 RX ORDER — IBUPROFEN 600 MG/1
600 TABLET ORAL EVERY 6 HOURS PRN
Status: DISCONTINUED | OUTPATIENT
Start: 2018-01-27 | End: 2018-01-27

## 2018-01-27 RX ORDER — SODIUM CHLORIDE 0.9 % (FLUSH) 0.9 %
10 SYRINGE (ML) INJECTION AS NEEDED
Status: DISCONTINUED | OUTPATIENT
Start: 2018-01-27 | End: 2018-01-27 | Stop reason: HOSPADM

## 2018-01-27 RX ORDER — SIMETHICONE 80 MG
80 TABLET,CHEWABLE ORAL 3 TIMES DAILY PRN
Status: DISCONTINUED | OUTPATIENT
Start: 2018-01-27 | End: 2018-01-29

## 2018-01-27 NOTE — H&P
[address] [place] [address] Patient Status:  Inpatient    1980 MRN [id number]   Location [address] [place] [address] Attending James Orona MD   Hosp Day # 0 PCP Kelin Saleem MD Exam:   Temp:  [97.2 °F (36.2 °C)] 97.2 °F (36.2 °C)  Pulse:  [81] 81  Resp:  [18] 18  BP: (137)/(85) 137/85    Constitutional: alert, appears stated age and cooperative  Respiratory: clear to auscultation bilaterally  Cardiac: regular rate and rhythm, S1,

## 2018-01-27 NOTE — TELEPHONE ENCOUNTER
Pls note, Pt 40 weeks 4 days reported she has been having contractions every 10 minutes since 6 am.   Unsure if leaking fluid. Stated she thinks she is having clear fluid, but not actively noticing leaking. Good fm.   Pt advised to monitor for leaking, plac

## 2018-01-27 NOTE — L&D DELIVERY NOTE
[facility]    Vaginal Delivery Note    Angelica Delaine Runner Patient Status:  Inpatient    1980 MRN [id number]   Location [unfilled] Attending Jim Guzman MD   Hosp Day # 0 PCP Navid Darby MD     Delivery     Infant

## 2018-01-28 LAB
BASOPHILS # BLD: 0 K/UL (ref 0–0.2)
BASOPHILS NFR BLD: 0 %
EOSINOPHIL # BLD: 0 K/UL (ref 0–0.7)
EOSINOPHIL NFR BLD: 0 %
ERYTHROCYTE [DISTWIDTH] IN BLOOD BY AUTOMATED COUNT: 14.3 % (ref 11–15)
HCT VFR BLD AUTO: 34 % (ref 35–48)
HGB BLD-MCNC: 11.5 G/DL (ref 12–16)
HIV1+2 AB SPEC QL IA.RAPID: NONREACTIVE
LYMPHOCYTES # BLD: 1.9 K/UL (ref 1–4)
LYMPHOCYTES NFR BLD: 16 %
MCH RBC QN AUTO: 30.9 PG (ref 27–32)
MCHC RBC AUTO-ENTMCNC: 33.8 G/DL (ref 32–37)
MCV RBC AUTO: 91.3 FL (ref 80–100)
MONOCYTES # BLD: 1 K/UL (ref 0–1)
MONOCYTES NFR BLD: 9 %
NEUTROPHILS # BLD AUTO: 9 K/UL (ref 1.8–7.7)
NEUTROPHILS NFR BLD: 75 %
PLATELET # BLD AUTO: 167 K/UL (ref 140–400)
PMV BLD AUTO: 10 FL (ref 7.4–10.3)
RBC # BLD AUTO: 3.73 M/UL (ref 3.7–5.4)
WBC # BLD AUTO: 12 K/UL (ref 4–11)

## 2018-01-28 NOTE — LACTATION NOTE
LACTATION NOTE - MOTHER      Evaluation Type: Inpatient         Maternal history  Maternal history: Depression    Breastfeeding goal  Breastfeeding goal: To maintain breast milk feeding per patient goal    Maternal Assessment  Bilateral Breasts: Symmetrica

## 2018-01-28 NOTE — PROGRESS NOTES
[facility]    OB/GYNE Progress Note      Gianna Goldman Patient Status:  Inpatient    1980 MRN [id number]   Location [facility] 3SE Attending Bert Vasquez MD   Hosp Day # 1 PCP MD Dave Velasquez UASA Negative 11/22/2017             Franklin Bowers MD  1/28/2018  8:42 AM

## 2018-01-28 NOTE — LACTATION NOTE
This note was copied from a baby's chart.   LACTATION NOTE - INFANT    Evaluation Type  Evaluation Type: Inpatient    Problems & Assessment  Problems Diagnosed or Identified: Shallow latch;Sleepy  Infant Assessment: Skin color: pink or appropriate for ethni

## 2018-01-29 VITALS
HEART RATE: 71 BPM | RESPIRATION RATE: 16 BRPM | HEIGHT: 64 IN | TEMPERATURE: 98 F | SYSTOLIC BLOOD PRESSURE: 109 MMHG | BODY MASS INDEX: 28.51 KG/M2 | DIASTOLIC BLOOD PRESSURE: 57 MMHG | WEIGHT: 167 LBS

## 2018-01-29 LAB
HGB F MFR BLD KLEIH BETKE: <0.1 %
HIV1+2 AB SERPL QL IA: NONREACTIVE
T PALLIDUM AB SER QL: NEGATIVE

## 2018-01-29 PROCEDURE — 59025 FETAL NON-STRESS TEST: CPT | Performed by: OBSTETRICS & GYNECOLOGY

## 2018-01-29 RX ORDER — IBUPROFEN 600 MG/1
600 TABLET ORAL EVERY 6 HOURS PRN
Qty: 30 TABLET | Refills: 0 | Status: SHIPPED | OUTPATIENT
Start: 2018-01-29 | End: 2019-07-02 | Stop reason: ALTCHOICE

## 2018-01-29 NOTE — PROGRESS NOTES
HIV screening order placed at 7pm but attempted to cancel due to HIV screening drawn yesterday. Could not cancel order. Called Lab to confirm no order was scheduled to be drawn and Lab voiced that there was no order.   Will pass on to dayshift RN and notif

## 2018-01-29 NOTE — DISCHARGE SUMMARY
[facility]    Discharge Summary    Gianna Childress Patient Status:  Inpatient    1980 MRN [id number]   Location [facility] 3SE Attending Elias Rodgers MD   Hosp Day # 2 PCP Marisabel Khan MD     Date of Admi Deirdre Sweet MD. Schedule an appointment as soon as possible for a visit in 6 weeks.     Specialty:  OBSTETRICS & GYNECOLOGY  Why:  postpartum exam  Contact information:  [address] [address]  [phone]

## 2018-02-10 RX ORDER — IBUPROFEN 600 MG/1
TABLET ORAL
Qty: 30 TABLET | Refills: 0 | OUTPATIENT
Start: 2018-02-10

## 2018-02-10 NOTE — TELEPHONE ENCOUNTER
Patient should not need 600 mg of Motrin anymore after a normal vaginal delivery with no lacerations. She should take OTC Motrin 200 mg 2 tabs every 6 hours as needed at this point.

## 2018-02-13 ENCOUNTER — TELEPHONE (OUTPATIENT)
Dept: PEDIATRICS CLINIC | Facility: CLINIC | Age: 38
End: 2018-02-13

## 2018-02-13 RX ORDER — IBUPROFEN 600 MG/1
TABLET ORAL
Qty: 30 TABLET | Refills: 0 | OUTPATIENT
Start: 2018-02-13

## 2018-02-13 NOTE — TELEPHONE ENCOUNTER
Pt says insurance company needs verification that she has delivered.  [facility] [phone]

## 2018-03-06 ENCOUNTER — OFFICE VISIT (OUTPATIENT)
Dept: OBGYN CLINIC | Facility: CLINIC | Age: 38
End: 2018-03-06

## 2018-03-06 VITALS — BODY MASS INDEX: 25 KG/M2 | WEIGHT: 147 LBS | DIASTOLIC BLOOD PRESSURE: 87 MMHG | SYSTOLIC BLOOD PRESSURE: 125 MMHG

## 2018-03-06 DIAGNOSIS — N39.3 GSI (GENUINE STRESS INCONTINENCE), FEMALE: ICD-10-CM

## 2018-03-06 DIAGNOSIS — R29.898 WEAKNESS OF BOTH LOWER EXTREMITIES: ICD-10-CM

## 2018-03-06 DIAGNOSIS — Z01.411 ENCOUNTER FOR GYNECOLOGICAL EXAMINATION WITH ABNORMAL FINDING: ICD-10-CM

## 2018-03-06 DIAGNOSIS — Z01.419 ENCOUNTER FOR GYNECOLOGICAL EXAMINATION WITHOUT ABNORMAL FINDING: Primary | ICD-10-CM

## 2018-03-06 PROCEDURE — 99395 PREV VISIT EST AGE 18-39: CPT | Performed by: OBSTETRICS & GYNECOLOGY

## 2018-03-06 NOTE — PROGRESS NOTES
HPI:    Patient ID: Jai Coronado is a 40year old female. HPI  Patient requests full annual exam with pap today. Reports GSI and lower extremity weakness bilaterally.   Reviewed Kegel exercises and discussed being evaluated and treated by PT Neurological: She is alert and oriented to person, place, and time. Skin: Skin is warm and dry. Psychiatric: She has a normal mood and affect. Her behavior is normal. Judgment and thought content normal.   Nursing note and vitals reviewed. Pap Done.

## 2018-03-07 ENCOUNTER — TELEPHONE (OUTPATIENT)
Dept: OBGYN CLINIC | Facility: CLINIC | Age: 38
End: 2018-03-07

## 2018-03-07 NOTE — TELEPHONE ENCOUNTER
Per pt states that she needs a note for work and states that she will be going to physical therapy so she does need note to say that she has some restrictions when going back to work.  Such as taking breaks and sitting down since she tends to stand around a

## 2018-03-08 NOTE — TELEPHONE ENCOUNTER
Dr Chauncey Mccord, Can you please specify exact restrictions to be included in letter (ex. How many breaks and how long breaks should be).

## 2018-03-09 LAB
HPV I/H RISK 1 DNA SPEC QL NAA+PROBE: NEGATIVE
LAST PAP RESULT: NORMAL
PAP HISTORY (OTHER THAN LAST PAP): NORMAL

## 2018-03-19 ENCOUNTER — TELEPHONE (OUTPATIENT)
Dept: OBGYN CLINIC | Facility: CLINIC | Age: 38
End: 2018-03-19

## 2018-03-19 NOTE — TELEPHONE ENCOUNTER
Per pt, she is breastfeeding and noticed that it became painful today. States pain is located on right breast only, states that it is only on one side of breast where she has the pain. States that it is 10/10 pain.  Denies any redness, states it is warm to

## 2018-03-20 NOTE — TELEPHONE ENCOUNTER
Pt has complaints of breast tenderness and fever yesterday. She took tylenol and continued breast feeding. Patient states her symptoms have now resolved. I discussed with patient possible mastitis which resolved spontaneously.   Patient is to call back i

## 2018-04-05 ENCOUNTER — TELEPHONE (OUTPATIENT)
Dept: OBGYN CLINIC | Facility: CLINIC | Age: 38
End: 2018-04-05

## 2018-04-26 ENCOUNTER — TELEPHONE (OUTPATIENT)
Dept: PHYSICAL THERAPY | Age: 38
End: 2018-04-26

## 2018-05-07 ENCOUNTER — TELEPHONE (OUTPATIENT)
Dept: PHYSICAL THERAPY | Age: 38
End: 2018-05-07

## 2018-05-10 ENCOUNTER — OFFICE VISIT (OUTPATIENT)
Dept: PHYSICAL THERAPY | Age: 38
End: 2018-05-10
Attending: OBSTETRICS & GYNECOLOGY
Payer: COMMERCIAL

## 2018-05-10 PROCEDURE — 97535 SELF CARE MNGMENT TRAINING: CPT

## 2018-05-10 PROCEDURE — 97161 PT EVAL LOW COMPLEX 20 MIN: CPT

## 2018-05-10 PROCEDURE — 97110 THERAPEUTIC EXERCISES: CPT

## 2018-05-10 NOTE — PROGRESS NOTES
PELVIC FLOOR EVALUATION:   Referring Physician: Dr. Celia Gramajo  Diagnosis: Weakness of both lower extremities (R29.898)  GSI (genuine stress incontinence), female (N39.3)  Date of Onset: January 2018     Date of Service: 5/10/2018     PATIENT Grisel Larson endurance, range of motion and coordination. Patient reports onset after delivery of child in January 2018. Functional limitations include coughing/sneezing/laughing without leakage, walking, and intercourse.  Objective testing demonstrates decreased stre Hyperlipidemia no      OBSTETRIC/GYNECOLOGIC HISTORY  : 3  Para: 2  Complications in pregnancies: no  Delivery Method: vaginal  Complications in deliveries: no  Episiotomy/tearing:no   Length of delivery/pushing: quick pushing  Menarche: 15 y/o  L Flexion 4/5 4/5    Extension 4/5 4/5    Abduction 4/5 4/5    Adduction 4/5 4/5    External Rotation 4/5 4/5    Internal Rotation 4/5 4/5    Knee:      Flexion 5/5 5/5    Extension 5/5 5/5    Ankle:      Dorsiflexion 5/5 5/5    Abdominals: 4-/5       FLEX WFL  Bearing down Valsalva maneuver (2-3x): anterior and posterior descent 1-2 cm above hymen visualized and palpated        PLAN OF CARE:      Today’s Treatment and Response:  Patient education provided on objective findings of external and internal evalu

## 2018-05-17 ENCOUNTER — OFFICE VISIT (OUTPATIENT)
Dept: PHYSICAL THERAPY | Age: 38
End: 2018-05-17
Attending: OBSTETRICS & GYNECOLOGY
Payer: COMMERCIAL

## 2018-05-17 PROCEDURE — 97140 MANUAL THERAPY 1/> REGIONS: CPT

## 2018-05-17 PROCEDURE — 97112 NEUROMUSCULAR REEDUCATION: CPT

## 2018-05-17 NOTE — PROGRESS NOTES
Diagnosis: Weakness of both lower extremities (R29.898)  GSI (genuine stress incontinence), female (N39.3)  Authorized # of Visits:  10       Next MD visit: not made   Referring Physician: Dr. Donald Sandra  Fall Risk: standard         Precautions: n/a

## 2018-05-24 ENCOUNTER — OFFICE VISIT (OUTPATIENT)
Dept: PHYSICAL THERAPY | Age: 38
End: 2018-05-24
Attending: OBSTETRICS & GYNECOLOGY
Payer: COMMERCIAL

## 2018-05-24 PROCEDURE — 97140 MANUAL THERAPY 1/> REGIONS: CPT

## 2018-05-24 PROCEDURE — 97112 NEUROMUSCULAR REEDUCATION: CPT

## 2018-05-24 NOTE — PROGRESS NOTES
Diagnosis: Weakness of both lower extremities (R29.898)  GSI (genuine stress incontinence), female (N39.3)  Authorized # of Visits:  10       Next MD visit: not made   Referring Physician: Dr. Juan Rosario  Fall Risk: standard         Precautions: n/a

## 2018-05-31 ENCOUNTER — APPOINTMENT (OUTPATIENT)
Dept: PHYSICAL THERAPY | Age: 38
End: 2018-05-31
Attending: OBSTETRICS & GYNECOLOGY
Payer: COMMERCIAL

## 2018-06-07 ENCOUNTER — OFFICE VISIT (OUTPATIENT)
Dept: PHYSICAL THERAPY | Age: 38
End: 2018-06-07
Attending: OBSTETRICS & GYNECOLOGY
Payer: COMMERCIAL

## 2018-06-07 PROCEDURE — 97112 NEUROMUSCULAR REEDUCATION: CPT

## 2018-06-07 NOTE — PROGRESS NOTES
Diagnosis: Weakness of both lower extremities (R29.898)  GSI (genuine stress incontinence), female (N39.3)  Authorized # of Visits:  10       Next MD visit: not made   Referring Physician: Dr. Chauncey Mccord  Fall Risk: standard         Precautions: n/a impaired  Assessment: Patient reports no leakage over the past 2 weeks. Has been non-compliant in HEP strengthening exercises. Understands she needs to perform 50 x per day to improve strength. Added more advanced core stabilization exercises.  Pelvis up

## 2018-06-14 ENCOUNTER — APPOINTMENT (OUTPATIENT)
Dept: PHYSICAL THERAPY | Age: 38
End: 2018-06-14
Attending: OBSTETRICS & GYNECOLOGY
Payer: COMMERCIAL

## 2018-06-14 ENCOUNTER — TELEPHONE (OUTPATIENT)
Dept: PHYSICAL THERAPY | Age: 38
End: 2018-06-14

## 2018-06-21 ENCOUNTER — OFFICE VISIT (OUTPATIENT)
Dept: PHYSICAL THERAPY | Age: 38
End: 2018-06-21
Attending: OBSTETRICS & GYNECOLOGY
Payer: COMMERCIAL

## 2018-06-21 PROCEDURE — 97112 NEUROMUSCULAR REEDUCATION: CPT

## 2018-06-21 NOTE — PROGRESS NOTES
Diagnosis: Weakness of both lower extremities (R29.898)  GSI (genuine stress incontinence), female (N39.3)  Authorized # of Visits:  10       Next MD visit: not made   Referring Physician: Dr. Araceli Avendaño  Fall Risk: standard         Precautions: n/a squeezes with breathing 20x with PVC  12. Sidelying hip IR/ER 20x  13 quadriped hip abd x 10  14. Quadriped hip ext x 10  15. Prone swims 20x  16.  Squat with exhale/PVC on standing 20x      Self-Care              Goals:Long Term Goals (Time Frame 10 visits

## 2018-06-28 ENCOUNTER — OFFICE VISIT (OUTPATIENT)
Dept: PHYSICAL THERAPY | Age: 38
End: 2018-06-28
Attending: OBSTETRICS & GYNECOLOGY
Payer: COMMERCIAL

## 2018-06-28 PROCEDURE — 97112 NEUROMUSCULAR REEDUCATION: CPT

## 2018-06-28 NOTE — PROGRESS NOTES
Diagnosis: Weakness of both lower extremities (R29.898)  GSI (genuine stress incontinence), female (N39.3)  Authorized # of Visits:  10       Next MD visit: not made   Referring Physician: Dr. Christopher Szymanski  Fall Risk: standard         Precautions: n/a cat/camel with PVC 20x  11. TA squeezes with breathing 20x with PVC  12. Sidelying hip IR/ER 20x  13 quadriped hip abd x 10  14. Quadriped hip ext x 10  15. Prone swims 20x  16. Squat with exhale/PVC on standing 20x    1. Add sqz 5 sec hold 20x  2.  PVC lev

## 2019-07-02 ENCOUNTER — OFFICE VISIT (OUTPATIENT)
Dept: INTERNAL MEDICINE CLINIC | Facility: CLINIC | Age: 39
End: 2019-07-02
Payer: COMMERCIAL

## 2019-07-02 VITALS
HEIGHT: 63.5 IN | SYSTOLIC BLOOD PRESSURE: 121 MMHG | TEMPERATURE: 98 F | BODY MASS INDEX: 23.8 KG/M2 | DIASTOLIC BLOOD PRESSURE: 79 MMHG | WEIGHT: 136 LBS | HEART RATE: 76 BPM

## 2019-07-02 DIAGNOSIS — Z00.00 ANNUAL PHYSICAL EXAM: Primary | ICD-10-CM

## 2019-07-02 DIAGNOSIS — H93.8X2 CONGESTION OF LEFT EAR: ICD-10-CM

## 2019-07-02 DIAGNOSIS — R79.89 LOW VITAMIN D LEVEL: ICD-10-CM

## 2019-07-02 DIAGNOSIS — R42 VERTIGO: ICD-10-CM

## 2019-07-02 PROBLEM — N39.3 GSI (GENUINE STRESS INCONTINENCE), FEMALE: Status: RESOLVED | Noted: 2018-03-06 | Resolved: 2019-07-02

## 2019-07-02 PROBLEM — R29.898 WEAKNESS OF BOTH LOWER EXTREMITIES: Status: RESOLVED | Noted: 2018-03-06 | Resolved: 2019-07-02

## 2019-07-02 PROBLEM — N89.8 VAGINAL ITCHING: Status: RESOLVED | Noted: 2017-06-14 | Resolved: 2019-07-02

## 2019-07-02 PROCEDURE — 99385 PREV VISIT NEW AGE 18-39: CPT | Performed by: INTERNAL MEDICINE

## 2019-07-02 RX ORDER — MECLIZINE HYDROCHLORIDE 25 MG/1
25 TABLET ORAL AS NEEDED
COMMUNITY
End: 2020-12-08

## 2019-07-02 NOTE — PATIENT INSTRUCTIONS
Prevention Guidelines, Women Ages 25 to 44  Screening tests and vaccines are an important part of managing your health. A screening test is done to find possible disorders or diseases in people who don't have any symptoms.  The goal is to find a disease e Type 2 diabetes, prediabetes All women diagnosed with gestational diabetes Lifelong testing every 3 years   Type 2 diabetes All women with prediabetes Every year   Gonorrhea Sexually active women at increased risk for infection At routine exams   Hepatitis Measles, mumps, rubella (MMR) All women in this age group who have no record of these infections or vaccines 1 or 2 doses   Meningococcal Women at increased risk for infection should talk with their healthcare provider 1 or more doses   Pneumococcal conjug © 3309-6729 [employer] [address]. [address] [facility], [address] [place]. All rights reserved. This information is not intended as a substitute for professional medical care. Always follow your healthcare professional's instructions.         Neck Ex © 6649-0947 [employer] [address]. [address] [facility], [address] [place]. All rights reserved. This information is not intended as a substitute for professional medical care. Always follow your healthcare professional's instructions.         Neck Ex © 9632-4592 [employer] [address]. [address] [facility], [address] [place]. All rights reserved. This information is not intended as a substitute for professional medical care. Always follow your healthcare professional's instructions.         Neck Ex

## 2019-07-02 NOTE — PROGRESS NOTES
Patient ID: Velma Holt is a 45year old female. Patient presents with:  Physical  Vertigo: Per patient for the last 2 weeks vertigo has gotten worse, and loss of hearing on left ear.  Per patient mother recently passed away, unsure if this t name: Not on file      Number of children: Not on file      Years of education: Not on file      Highest education level: Not on file    Occupational History      Not on file    Social Needs      Financial resource strain: Not on file      Food insecurity: maxillary sinus tenderness and no frontal sinus tenderness. Mouth/Throat: No oropharyngeal exudate, posterior oropharyngeal edema, posterior oropharyngeal erythema or tonsillar abscesses. Eyes: No scleral icterus. Neck: Normal range of motion.  Neck s

## 2019-07-06 ENCOUNTER — OFFICE VISIT (OUTPATIENT)
Dept: OTOLARYNGOLOGY | Facility: CLINIC | Age: 39
End: 2019-07-06
Payer: COMMERCIAL

## 2019-07-06 ENCOUNTER — LAB ENCOUNTER (OUTPATIENT)
Dept: LAB | Facility: HOSPITAL | Age: 39
End: 2019-07-06
Attending: INTERNAL MEDICINE
Payer: COMMERCIAL

## 2019-07-06 VITALS
BODY MASS INDEX: 23.05 KG/M2 | TEMPERATURE: 98 F | SYSTOLIC BLOOD PRESSURE: 114 MMHG | WEIGHT: 135 LBS | HEIGHT: 64 IN | DIASTOLIC BLOOD PRESSURE: 79 MMHG

## 2019-07-06 DIAGNOSIS — R79.89 LOW VITAMIN D LEVEL: ICD-10-CM

## 2019-07-06 DIAGNOSIS — R42 DIZZINESS: Primary | ICD-10-CM

## 2019-07-06 DIAGNOSIS — Z00.00 ANNUAL PHYSICAL EXAM: ICD-10-CM

## 2019-07-06 LAB
ALBUMIN SERPL-MCNC: 3.7 G/DL (ref 3.4–5)
ALBUMIN/GLOB SERPL: 1 {RATIO} (ref 1–2)
ALP LIVER SERPL-CCNC: 69 U/L (ref 37–98)
ALT SERPL-CCNC: 15 U/L (ref 13–56)
ANION GAP SERPL CALC-SCNC: 5 MMOL/L (ref 0–18)
AST SERPL-CCNC: 9 U/L (ref 15–37)
BASOPHILS # BLD AUTO: 0.06 X10(3) UL (ref 0–0.2)
BASOPHILS NFR BLD AUTO: 0.9 %
BILIRUB SERPL-MCNC: 0.4 MG/DL (ref 0.1–2)
BUN BLD-MCNC: 10 MG/DL (ref 7–18)
BUN/CREAT SERPL: 15.9 (ref 10–20)
CALCIUM BLD-MCNC: 8.5 MG/DL (ref 8.5–10.1)
CHLORIDE SERPL-SCNC: 109 MMOL/L (ref 98–112)
CHOLEST SMN-MCNC: 175 MG/DL (ref ?–200)
CO2 SERPL-SCNC: 25 MMOL/L (ref 21–32)
CREAT BLD-MCNC: 0.63 MG/DL (ref 0.55–1.02)
DEPRECATED RDW RBC AUTO: 41.5 FL (ref 35.1–46.3)
EOSINOPHIL # BLD AUTO: 0.05 X10(3) UL (ref 0–0.7)
EOSINOPHIL NFR BLD AUTO: 0.7 %
ERYTHROCYTE [DISTWIDTH] IN BLOOD BY AUTOMATED COUNT: 13.4 % (ref 11–15)
GLOBULIN PLAS-MCNC: 3.8 G/DL (ref 2.8–4.4)
GLUCOSE BLD-MCNC: 88 MG/DL (ref 70–99)
HCT VFR BLD AUTO: 36.9 % (ref 35–48)
HDLC SERPL-MCNC: 47 MG/DL (ref 40–59)
HGB BLD-MCNC: 11.6 G/DL (ref 12–16)
IMM GRANULOCYTES # BLD AUTO: 0.02 X10(3) UL (ref 0–1)
IMM GRANULOCYTES NFR BLD: 0.3 %
LDLC SERPL CALC-MCNC: 109 MG/DL (ref ?–100)
LYMPHOCYTES # BLD AUTO: 2.41 X10(3) UL (ref 1–4)
LYMPHOCYTES NFR BLD AUTO: 36.1 %
M PROTEIN MFR SERPL ELPH: 7.5 G/DL (ref 6.4–8.2)
MCH RBC QN AUTO: 27 PG (ref 26–34)
MCHC RBC AUTO-ENTMCNC: 31.4 G/DL (ref 31–37)
MCV RBC AUTO: 86 FL (ref 80–100)
MONOCYTES # BLD AUTO: 0.56 X10(3) UL (ref 0.1–1)
MONOCYTES NFR BLD AUTO: 8.4 %
NEUTROPHILS # BLD AUTO: 3.57 X10 (3) UL (ref 1.5–7.7)
NEUTROPHILS # BLD AUTO: 3.57 X10(3) UL (ref 1.5–7.7)
NEUTROPHILS NFR BLD AUTO: 53.6 %
NONHDLC SERPL-MCNC: 128 MG/DL (ref ?–130)
OSMOLALITY SERPL CALC.SUM OF ELEC: 286 MOSM/KG (ref 275–295)
PATIENT FASTING: YES
PATIENT FASTING: YES
PLATELET # BLD AUTO: 271 10(3)UL (ref 150–450)
POTASSIUM SERPL-SCNC: 4.1 MMOL/L (ref 3.5–5.1)
RBC # BLD AUTO: 4.29 X10(6)UL (ref 3.8–5.3)
SODIUM SERPL-SCNC: 139 MMOL/L (ref 136–145)
TRIGL SERPL-MCNC: 94 MG/DL (ref 30–149)
TSI SER-ACNC: 1.02 MIU/ML (ref 0.36–3.74)
VLDLC SERPL CALC-MCNC: 19 MG/DL (ref 0–30)
WBC # BLD AUTO: 6.7 X10(3) UL (ref 4–11)

## 2019-07-06 PROCEDURE — 99212 OFFICE O/P EST SF 10 MIN: CPT | Performed by: OTOLARYNGOLOGY

## 2019-07-06 PROCEDURE — 80061 LIPID PANEL: CPT

## 2019-07-06 PROCEDURE — 82306 VITAMIN D 25 HYDROXY: CPT

## 2019-07-06 PROCEDURE — 36415 COLL VENOUS BLD VENIPUNCTURE: CPT

## 2019-07-06 PROCEDURE — 84443 ASSAY THYROID STIM HORMONE: CPT

## 2019-07-06 PROCEDURE — 85025 COMPLETE CBC W/AUTO DIFF WBC: CPT

## 2019-07-06 PROCEDURE — 80053 COMPREHEN METABOLIC PANEL: CPT

## 2019-07-06 PROCEDURE — 99203 OFFICE O/P NEW LOW 30 MIN: CPT | Performed by: OTOLARYNGOLOGY

## 2019-07-06 NOTE — PROGRESS NOTES
Osbaldo Wyman is a 45year old female.   Patient presents with:  Dizziness: since 2018, episodes come and go   Ear Problem: clogged left ear       HISTORY OF PRESENT ILLNESS  She presents with a 1.5-year history of recurrent episodes of dizziness Negative Frequent skin infections, pigment change and rash. Hema/Lymph Negative Easy bleeding and easy bruising.            PHYSICAL EXAM    /79   Temp 97.8 °F (36.6 °C) (Oral)   Ht 5' 4\" (1.626 m)   Wt 135 lb (61.2 kg)   LMP 06/10/2019 (Within Day management. - OP REFERRAL TO AUDIOLOGY            Byron Cole.  Kody Kong MD    7/6/2019    12:40 PM

## 2019-07-08 LAB — 25(OH)D3 SERPL-MCNC: 25 NG/ML (ref 30–100)

## 2019-07-11 RX ORDER — MULTIVIT-MIN/IRON/FOLIC ACID/K 18-600-40
2000 CAPSULE ORAL DAILY
Qty: 1 CAPSULE | Refills: 0 | COMMUNITY
Start: 2019-07-11 | End: 2020-01-16

## 2019-07-15 ENCOUNTER — NURSE TRIAGE (OUTPATIENT)
Dept: INTERNAL MEDICINE CLINIC | Facility: CLINIC | Age: 39
End: 2019-07-15

## 2019-07-15 ENCOUNTER — OFFICE VISIT (OUTPATIENT)
Dept: INTERNAL MEDICINE CLINIC | Facility: CLINIC | Age: 39
End: 2019-07-15
Payer: COMMERCIAL

## 2019-07-15 VITALS
HEART RATE: 67 BPM | RESPIRATION RATE: 16 BRPM | SYSTOLIC BLOOD PRESSURE: 125 MMHG | WEIGHT: 135 LBS | DIASTOLIC BLOOD PRESSURE: 82 MMHG | BODY MASS INDEX: 23 KG/M2

## 2019-07-15 DIAGNOSIS — M54.2 NECK PAIN: Primary | ICD-10-CM

## 2019-07-15 PROCEDURE — 99213 OFFICE O/P EST LOW 20 MIN: CPT | Performed by: PHYSICIAN ASSISTANT

## 2019-07-15 PROCEDURE — 99212 OFFICE O/P EST SF 10 MIN: CPT | Performed by: PHYSICIAN ASSISTANT

## 2019-07-15 RX ORDER — CYCLOBENZAPRINE HCL 10 MG
10 TABLET ORAL NIGHTLY
Qty: 20 TABLET | Refills: 0 | Status: SHIPPED | OUTPATIENT
Start: 2019-07-15 | End: 2019-08-04

## 2019-07-15 NOTE — PROGRESS NOTES
HPI:    Patient ID: Shari Mir is a 45year old female. HPI   Patient complains of neck pain bilaterally since yesterday. States she can still move it but it feels sore and has pain with movement.  Has taken one dose of advil with minimal r membrane normal.   Nose: Nose normal.   Mouth/Throat: Oropharynx is clear and moist.   Eyes: Pupils are equal, round, and reactive to light. Conjunctivae and EOM are normal.   Neck: Neck supple. Muscular tenderness present. No neck rigidity.  Decreased rang

## 2019-07-15 NOTE — TELEPHONE ENCOUNTER
Action Requested: Summary for Provider     []  Critical Lab, Recommendations Needed  [] Need Additional Advice  []   FYI    []   Need Orders  [] Need Medications Sent to Pharmacy  []  Other     SUMMARY: Per protocol advised OV.  Scheduled today with Dr. Brenton Lang

## 2019-08-20 ENCOUNTER — OFFICE VISIT (OUTPATIENT)
Dept: AUDIOLOGY | Facility: CLINIC | Age: 39
End: 2019-08-20
Payer: COMMERCIAL

## 2019-08-20 DIAGNOSIS — R42 DIZZINESS: Primary | ICD-10-CM

## 2019-08-20 PROCEDURE — 92537 CALORIC VSTBLR TEST W/REC: CPT | Performed by: AUDIOLOGIST

## 2019-08-20 PROCEDURE — 92585 AUDITORY EVOKED POTENTIAL: CPT | Performed by: AUDIOLOGIST

## 2019-08-20 PROCEDURE — 92557 COMPREHENSIVE HEARING TEST: CPT | Performed by: AUDIOLOGIST

## 2019-08-20 PROCEDURE — 92567 TYMPANOMETRY: CPT | Performed by: AUDIOLOGIST

## 2019-08-20 PROCEDURE — 92540 BASIC VESTIBULAR EVALUATION: CPT | Performed by: AUDIOLOGIST

## 2019-08-21 NOTE — PROGRESS NOTES
AUDIOLOGY AND VESTIBULAR REPORT      Gianna Panchal is a 45year old female     Referring Provider: Shan Vieyra   YOB: 1980  Medical Record: [id number]      Patient Hearing History:  Patient reported dizziness for the past few mo peak velocities, accuracies and latencies for horizontal saccades. Gaze Nystagmus Test: No gaze nystagmus noted. Tracking Test: Normal horizontal tracking in both directions.     Optokinetic Test: Symmetrical optokinetic testing in both directions at

## 2019-08-29 ENCOUNTER — OFFICE VISIT (OUTPATIENT)
Dept: OTOLARYNGOLOGY | Facility: CLINIC | Age: 39
End: 2019-08-29
Payer: COMMERCIAL

## 2019-08-29 VITALS
WEIGHT: 135 LBS | TEMPERATURE: 98 F | SYSTOLIC BLOOD PRESSURE: 109 MMHG | BODY MASS INDEX: 23.05 KG/M2 | DIASTOLIC BLOOD PRESSURE: 75 MMHG | HEIGHT: 64 IN

## 2019-08-29 DIAGNOSIS — R42 DIZZINESS: Primary | ICD-10-CM

## 2019-08-29 PROCEDURE — 99212 OFFICE O/P EST SF 10 MIN: CPT | Performed by: OTOLARYNGOLOGY

## 2019-08-29 PROCEDURE — 99214 OFFICE O/P EST MOD 30 MIN: CPT | Performed by: OTOLARYNGOLOGY

## 2019-08-30 NOTE — PROGRESS NOTES
Vidhi Baker is a 45year old female. Patient presents with:  Results: vng test done on 8/22/19      HISTORY OF PRESENT ILLNESS  She presents with a 1.5-year history of recurrent episodes of dizziness.   These episodes last for minutes at a leilani Respiratory Negative Dyspnea and wheezing. Cardio Negative Chest pain, irregular heartbeat/palpitations and syncope. GI Negative Abdominal pain and diarrhea. Endocrine Negative Cold intolerance and heat intolerance. Neuro Negative Tremors.    Psyc capsule (2,000 Units total) by mouth daily. , Disp: 1 capsule, Rfl: 0  •  Meclizine HCl 25 MG Oral Tab, Take 25 mg by mouth as needed. , Disp: , Rfl:   ASSESSMENT AND PLAN    1. Dizziness  I recommend that she consider balance therapy and exercises.   We did

## 2019-09-24 ENCOUNTER — TELEPHONE (OUTPATIENT)
Dept: OTOLARYNGOLOGY | Facility: CLINIC | Age: 39
End: 2019-09-24

## 2019-09-24 DIAGNOSIS — R42 DIZZINESS: Primary | ICD-10-CM

## 2019-09-24 NOTE — TELEPHONE ENCOUNTER
Rn ordered Balance therapy per JDO'S recommendation from pt last visit note and informed pt ,provided number to schedule [phone].

## 2019-09-25 ENCOUNTER — TELEPHONE (OUTPATIENT)
Dept: PHYSICAL THERAPY | Facility: HOSPITAL | Age: 39
End: 2019-09-25

## 2019-12-11 ENCOUNTER — TELEPHONE (OUTPATIENT)
Dept: PHYSICAL THERAPY | Facility: HOSPITAL | Age: 39
End: 2019-12-11

## 2019-12-17 ENCOUNTER — OFFICE VISIT (OUTPATIENT)
Dept: PHYSICAL THERAPY | Facility: HOSPITAL | Age: 39
End: 2019-12-17
Attending: OTOLARYNGOLOGY
Payer: COMMERCIAL

## 2019-12-17 DIAGNOSIS — R42 DIZZINESS: ICD-10-CM

## 2019-12-17 PROCEDURE — 97112 NEUROMUSCULAR REEDUCATION: CPT

## 2019-12-17 PROCEDURE — 97163 PT EVAL HIGH COMPLEX 45 MIN: CPT

## 2019-12-17 NOTE — PATIENT INSTRUCTIONS
Do this exercise 6 times each day:   Stand tall, arms length away from target on wall at eye level. Turn head side to side like you are saying \"no\",  while keeping eyes focused on the target.   Make sure that your head movements are small but as fast as

## 2019-12-17 NOTE — PROGRESS NOTES
PHYSICAL THERAPY EVALUATION:   Referring Physician: Dr. Jacobo Velasquez  Diagnosis: Dizziness      Date of Onset: per HPI Date of Service: 12/17/2019        PATIENT SUMMARY   Sonia Rhodes is a 44year old y/o female who presents to therapy today with with Gianna. Significant findings include   Past Medical History:   Diagnosis Date   • Depression 2010    tx for a few months   • Vertigo              ASSESSMENT:      Gianna presents today with hx of dizziness which waxes and wanes.  VNG demonstrated i exam findings and POC. Pt instructed in and performed the following for HEP: VOR x1 horizontal (increased sx's) and vertical (increased sx's) x1 min ea -demonstration with return demonstration, pt educated on rationale for exercise. Rest break after each.

## 2019-12-30 ENCOUNTER — TELEPHONE (OUTPATIENT)
Dept: PHYSICAL THERAPY | Facility: HOSPITAL | Age: 39
End: 2019-12-30

## 2019-12-30 ENCOUNTER — APPOINTMENT (OUTPATIENT)
Dept: PHYSICAL THERAPY | Facility: HOSPITAL | Age: 39
End: 2019-12-30
Attending: OTOLARYNGOLOGY
Payer: COMMERCIAL

## 2020-01-02 ENCOUNTER — APPOINTMENT (OUTPATIENT)
Dept: PHYSICAL THERAPY | Facility: HOSPITAL | Age: 40
End: 2020-01-02
Attending: OTOLARYNGOLOGY
Payer: COMMERCIAL

## 2020-01-07 ENCOUNTER — OFFICE VISIT (OUTPATIENT)
Dept: OBGYN CLINIC | Facility: CLINIC | Age: 40
End: 2020-01-07
Payer: COMMERCIAL

## 2020-01-07 VITALS
SYSTOLIC BLOOD PRESSURE: 108 MMHG | DIASTOLIC BLOOD PRESSURE: 62 MMHG | BODY MASS INDEX: 23.56 KG/M2 | WEIGHT: 138 LBS | HEIGHT: 64 IN

## 2020-01-07 DIAGNOSIS — Z00.00 PHYSICAL EXAM: Primary | ICD-10-CM

## 2020-01-07 DIAGNOSIS — Z01.419 ENCOUNTER FOR GYNECOLOGICAL EXAMINATION WITHOUT ABNORMAL FINDING: ICD-10-CM

## 2020-01-07 PROCEDURE — 99395 PREV VISIT EST AGE 18-39: CPT | Performed by: OBSTETRICS & GYNECOLOGY

## 2020-01-07 NOTE — PROGRESS NOTES
HPI:    Patient ID: Esperanza Dickerson is a 44year old female. Patient here for routine exam.  No C/Os. Declines contraception.  using condoms. Desires screening tests. Needs pap smear next year.   Will start annual mammograms next year Physical exam  (primary encounter diagnosis)  Encounter for gynecological examination without abnormal finding    F/U Labs. Encouraged monthly SBE. Discussed diet and exercise. Encouraged condoms.     Orders Placed This Encounter      [facility]

## 2020-01-09 ENCOUNTER — LAB ENCOUNTER (OUTPATIENT)
Dept: LAB | Facility: HOSPITAL | Age: 40
End: 2020-01-09
Attending: OBSTETRICS & GYNECOLOGY
Payer: COMMERCIAL

## 2020-01-09 ENCOUNTER — OFFICE VISIT (OUTPATIENT)
Dept: PHYSICAL THERAPY | Facility: HOSPITAL | Age: 40
End: 2020-01-09
Attending: OTOLARYNGOLOGY
Payer: COMMERCIAL

## 2020-01-09 DIAGNOSIS — Z00.00 PHYSICAL EXAM: ICD-10-CM

## 2020-01-09 DIAGNOSIS — R42 DIZZINESS: ICD-10-CM

## 2020-01-09 LAB
BASOPHILS # BLD AUTO: 0.05 X10(3) UL (ref 0–0.2)
BASOPHILS NFR BLD AUTO: 0.8 %
DEPRECATED RDW RBC AUTO: 41.9 FL (ref 35.1–46.3)
EOSINOPHIL # BLD AUTO: 0.08 X10(3) UL (ref 0–0.7)
EOSINOPHIL NFR BLD AUTO: 1.2 %
ERYTHROCYTE [DISTWIDTH] IN BLOOD BY AUTOMATED COUNT: 14.4 % (ref 11–15)
HAV IGM SER QL: 2.1 MG/DL (ref 1.6–2.6)
HCT VFR BLD AUTO: 34 % (ref 35–48)
HGB BLD-MCNC: 10.3 G/DL (ref 12–16)
IMM GRANULOCYTES # BLD AUTO: 0.02 X10(3) UL (ref 0–1)
IMM GRANULOCYTES NFR BLD: 0.3 %
LYMPHOCYTES # BLD AUTO: 2.65 X10(3) UL (ref 1–4)
LYMPHOCYTES NFR BLD AUTO: 41 %
MCH RBC QN AUTO: 24.4 PG (ref 26–34)
MCHC RBC AUTO-ENTMCNC: 30.3 G/DL (ref 31–37)
MCV RBC AUTO: 80.6 FL (ref 80–100)
MONOCYTES # BLD AUTO: 0.57 X10(3) UL (ref 0.1–1)
MONOCYTES NFR BLD AUTO: 8.8 %
NEUTROPHILS # BLD AUTO: 3.1 X10 (3) UL (ref 1.5–7.7)
NEUTROPHILS # BLD AUTO: 3.1 X10(3) UL (ref 1.5–7.7)
NEUTROPHILS NFR BLD AUTO: 47.9 %
PLATELET # BLD AUTO: 282 10(3)UL (ref 150–450)
RBC # BLD AUTO: 4.22 X10(6)UL (ref 3.8–5.3)
T4 FREE SERPL-MCNC: 0.8 NG/DL (ref 0.8–1.7)
TSI SER-ACNC: 2.54 MIU/ML (ref 0.36–3.74)
WBC # BLD AUTO: 6.5 X10(3) UL (ref 4–11)

## 2020-01-09 PROCEDURE — 84439 ASSAY OF FREE THYROXINE: CPT

## 2020-01-09 PROCEDURE — 85025 COMPLETE CBC W/AUTO DIFF WBC: CPT

## 2020-01-09 PROCEDURE — 83735 ASSAY OF MAGNESIUM: CPT

## 2020-01-09 PROCEDURE — 84443 ASSAY THYROID STIM HORMONE: CPT

## 2020-01-09 PROCEDURE — 36415 COLL VENOUS BLD VENIPUNCTURE: CPT

## 2020-01-09 PROCEDURE — 97112 NEUROMUSCULAR REEDUCATION: CPT

## 2020-01-09 NOTE — PROGRESS NOTES
Diagnosis: Dizziness   Visit (# authorized): 8 per [facility] [email or web address])                         Referring Physician: Dr Darryl Driscoll     Precautions: none     Medication changes since last visit?:  No    Subjective: Sx's currently 5/10, feels \"odd\".  Noted p Assessment:   Pt reports feeling \"better\" at end of session with symptoms reduced to 3/10. She verbalized and demonstrated understanding of material taught today.      Plan: in future sessions: cone taps, continue grounding, VOR cxl with ambulation, V

## 2020-01-09 NOTE — PATIENT INSTRUCTIONS
Yoga with Nathalie -youtube FREE! Blue light filter: f.lux FREE! Www.justgetflux. com     Do this exercise 6 times each day:   Stand tall, arms length away from target on wall at eye level.   Turn head side to side like you are saying \"no\",  while keeping

## 2020-01-11 ENCOUNTER — TELEPHONE (OUTPATIENT)
Dept: OBGYN CLINIC | Facility: CLINIC | Age: 40
End: 2020-01-11

## 2020-01-11 NOTE — TELEPHONE ENCOUNTER
LMTCB    ----- Message from Arnaud Holliday MD sent at 1/9/2020  4:54 PM CST -----  Normal labs but slightly anemic. Patient should start Iron tab daily. Call patient.

## 2020-01-14 NOTE — TELEPHONE ENCOUNTER
Spoke with pt advised per MLM normal labs but she is slightly anemic and she should start iron tablet daily. Pt agreed and voiced understanding.

## 2020-01-16 RX ORDER — MULTIVIT-MIN/IRON/FOLIC ACID/K 18-600-40
2000 CAPSULE ORAL DAILY
Qty: 12 CAPSULE | Refills: 1 | Status: SHIPPED | OUTPATIENT
Start: 2020-01-16 | End: 2020-01-20

## 2020-01-16 NOTE — TELEPHONE ENCOUNTER
RN returned call to patient. Pt advised can trial Slow Fe or Ferrous Sulfate 325mg qdaily per MLM advice.  RN also reviews The most common side effects associated with ferrous sulfate treatment are:  Constipation, dark stools, stomach pain,nausea, and vomit

## 2020-01-17 NOTE — TELEPHONE ENCOUNTER
Review pended refill request as it does not fall under a protocol.     Refill Protocol Appointment Criteria  · Appointment scheduled in the past 12 months or in the next 3 months  Recent Outpatient Visits            1 week ago [address]

## 2020-01-20 RX ORDER — MULTIVIT-MIN/IRON/FOLIC ACID/K 18-600-40
2000 CAPSULE ORAL DAILY
Qty: 12 CAPSULE | Refills: 1 | Status: SHIPPED | OUTPATIENT
Start: 2020-01-20 | End: 2020-01-21

## 2020-01-21 ENCOUNTER — OFFICE VISIT (OUTPATIENT)
Dept: PHYSICAL THERAPY | Facility: HOSPITAL | Age: 40
End: 2020-01-21
Attending: OTOLARYNGOLOGY
Payer: COMMERCIAL

## 2020-01-21 ENCOUNTER — TELEPHONE (OUTPATIENT)
Dept: INTERNAL MEDICINE CLINIC | Facility: CLINIC | Age: 40
End: 2020-01-21

## 2020-01-21 DIAGNOSIS — R42 DIZZINESS: ICD-10-CM

## 2020-01-21 PROCEDURE — 97112 NEUROMUSCULAR REEDUCATION: CPT

## 2020-01-21 RX ORDER — MULTIVIT-MIN/IRON/FOLIC ACID/K 18-600-40
CAPSULE ORAL
Qty: 12 CAPSULE | Refills: 1 | Status: SHIPPED | OUTPATIENT
Start: 2020-01-21 | End: 2020-12-08

## 2020-01-21 NOTE — PROGRESS NOTES
Diagnosis: Dizziness   Visit (# authorized): 8 per [facility] [employer])                         Referring Physician: Dr Leigh Lofton     Precautions: none     Medication changes since last visit?:  No    Subjective: Pt reports being asymptomatic since she was

## 2020-01-21 NOTE — PATIENT INSTRUCTIONS
Yoga with Nathalie -youtube FREE! Blue light filter: f.lux  [employer]     Do this exercise 6 times each day:   Stand tall, arms length away from target (checkerboard) on wall at eye level.   Turn head side to side like you are saying \"no\",  wh

## 2020-01-21 NOTE — TELEPHONE ENCOUNTER
Dr. Dorathy Ormond please advised on vitamin D directions. Would you like the pt to be taking one capsule daily or one capsule per week quantity is 12.

## 2020-01-30 ENCOUNTER — APPOINTMENT (OUTPATIENT)
Dept: PHYSICAL THERAPY | Facility: HOSPITAL | Age: 40
End: 2020-01-30
Attending: OTOLARYNGOLOGY
Payer: COMMERCIAL

## 2020-02-06 ENCOUNTER — OFFICE VISIT (OUTPATIENT)
Dept: PHYSICAL THERAPY | Facility: HOSPITAL | Age: 40
End: 2020-02-06
Attending: OTOLARYNGOLOGY
Payer: COMMERCIAL

## 2020-02-06 PROCEDURE — 97112 NEUROMUSCULAR REEDUCATION: CPT

## 2020-02-06 NOTE — PROGRESS NOTES
Discharge Summary    Referring Physician: Dr Lalit Marcum    Diagnosis: Dizziness     Pt has attended 4 visits in Physical Therapy. Subjective: Pt reports experiencing a headache after last session.  She has had minimal dizziness since she was last seen that 2/10. Goal met       Charges: 1 NMR     Total timed treatment: 10 mins   Total treatment time: 18 mins      Plan: Discharge    Patient  was advised of these findings, precautions, and treatment options and has agreed to actively participate in planning and

## 2020-02-18 ENCOUNTER — APPOINTMENT (OUTPATIENT)
Dept: PHYSICAL THERAPY | Facility: HOSPITAL | Age: 40
End: 2020-02-18
Attending: INTERNAL MEDICINE
Payer: COMMERCIAL

## 2020-12-08 ENCOUNTER — HOSPITAL ENCOUNTER (OUTPATIENT)
Dept: GENERAL RADIOLOGY | Facility: HOSPITAL | Age: 40
Discharge: HOME | End: 2020-12-08
Attending: INTERNAL MEDICINE
Payer: COMMERCIAL

## 2020-12-08 ENCOUNTER — LAB ENCOUNTER (OUTPATIENT)
Dept: LAB | Facility: HOSPITAL | Age: 40
End: 2020-12-08
Attending: INTERNAL MEDICINE
Payer: COMMERCIAL

## 2020-12-08 ENCOUNTER — OFFICE VISIT (OUTPATIENT)
Dept: INTERNAL MEDICINE CLINIC | Facility: CLINIC | Age: 40
End: 2020-12-08
Payer: COMMERCIAL

## 2020-12-08 VITALS
BODY MASS INDEX: 23.39 KG/M2 | HEART RATE: 77 BPM | SYSTOLIC BLOOD PRESSURE: 127 MMHG | WEIGHT: 137 LBS | HEIGHT: 64 IN | DIASTOLIC BLOOD PRESSURE: 84 MMHG
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
## 2020-12-08 DIAGNOSIS — M25.561 ACUTE PAIN OF RIGHT KNEE: ICD-10-CM

## 2020-12-08 DIAGNOSIS — M54.2 CERVICALGIA: ICD-10-CM

## 2020-12-08 DIAGNOSIS — R79.89 LOW VITAMIN D LEVEL: ICD-10-CM

## 2020-12-08 PROCEDURE — 72050 X-RAY EXAM NECK SPINE 4/5VWS: CPT | Performed by: INTERNAL MEDICINE

## 2020-12-08 PROCEDURE — 3008F BODY MASS INDEX DOCD: CPT | Performed by: INTERNAL MEDICINE

## 2020-12-08 PROCEDURE — 99212 OFFICE O/P EST SF 10 MIN: CPT | Performed by: INTERNAL MEDICINE

## 2020-12-08 PROCEDURE — 99214 OFFICE O/P EST MOD 30 MIN: CPT | Performed by: INTERNAL MEDICINE

## 2020-12-08 PROCEDURE — 82306 VITAMIN D 25 HYDROXY: CPT

## 2020-12-08 PROCEDURE — 73564 X-RAY EXAM KNEE 4 OR MORE: CPT | Performed by: INTERNAL MEDICINE

## 2020-12-08 PROCEDURE — 3074F SYST BP LT 130 MM HG: CPT | Performed by: INTERNAL MEDICINE

## 2020-12-08 PROCEDURE — 3079F DIAST BP 80-89 MM HG: CPT | Performed by: INTERNAL MEDICINE

## 2020-12-08 PROCEDURE — 36415 COLL VENOUS BLD VENIPUNCTURE: CPT
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
## 2020-12-08 NOTE — PATIENT INSTRUCTIONS
1. Use warm compresses several times a day on your neck and knee. 2. Get x-rays done soon as you can. 3. Okay to use over-the-counter anti-inflammatory such as Motrin, ibuprofen, Aleve, or Advil.

## 2020-12-22 ENCOUNTER — OFFICE VISIT (OUTPATIENT)
Dept: INTERNAL MEDICINE CLINIC | Facility: CLINIC | Age: 40
End: 2020-12-22
Payer: COMMERCIAL

## 2020-12-22 VITALS
SYSTOLIC BLOOD PRESSURE: 98 MMHG | RESPIRATION RATE: 15 BRPM | WEIGHT: 143 LBS | HEART RATE: 92 BPM | HEIGHT: 64 IN | DIASTOLIC BLOOD PRESSURE: 63 MMHG | BODY MASS INDEX: 24.41 KG/M2

## 2020-12-22 DIAGNOSIS — L91.8 SKIN TAG: ICD-10-CM

## 2020-12-22 DIAGNOSIS — R79.89 LOW VITAMIN D LEVEL: ICD-10-CM

## 2020-12-22 DIAGNOSIS — R41.840 CONCENTRATION DEFICIT: ICD-10-CM

## 2020-12-22 DIAGNOSIS — M54.2 CERVICALGIA: ICD-10-CM

## 2020-12-22 DIAGNOSIS — M25.561 ACUTE PAIN OF RIGHT KNEE: ICD-10-CM
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
## 2020-12-22 PROCEDURE — 99214 OFFICE O/P EST MOD 30 MIN: CPT | Performed by: INTERNAL MEDICINE

## 2020-12-22 PROCEDURE — 3008F BODY MASS INDEX DOCD: CPT | Performed by: INTERNAL MEDICINE

## 2020-12-22 PROCEDURE — 3074F SYST BP LT 130 MM HG: CPT | Performed by: INTERNAL MEDICINE

## 2020-12-22 PROCEDURE — 3078F DIAST BP <80 MM HG: CPT | Performed by: INTERNAL MEDICINE

## 2020-12-22 PROCEDURE — 99212 OFFICE O/P EST SF 10 MIN: CPT | Performed by: INTERNAL MEDICINE
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
## 2021-03-12 ENCOUNTER — OFFICE VISIT (OUTPATIENT)
Dept: DERMATOLOGY CLINIC | Facility: CLINIC | Age: 41
End: 2021-03-12
Payer: COMMERCIAL

## 2021-03-12 DIAGNOSIS — L65.0 TELOGEN EFFLUVIUM: Primary | ICD-10-CM

## 2021-03-12 DIAGNOSIS — L91.8 SKIN TAGS, MULTIPLE ACQUIRED: ICD-10-CM

## 2021-03-12 PROCEDURE — 99213 OFFICE O/P EST LOW 20 MIN: CPT | Performed by: PHYSICIAN ASSISTANT

## 2021-03-12 PROCEDURE — 11200 RMVL SKIN TAGS UP TO&INC 15: CPT | Performed by: PHYSICIAN ASSISTANT

## 2021-03-12 NOTE — PROCEDURES
Procedure:  Skin tag removal  With the patient is a supine position, the skin was scrubbed with alcohol.   Anesthesia was obtained by injecting 2 mL of 1% Xylocaine with Epinephrine around the bilateral neck, under the right arm and on the upper right thigh

## 2021-03-12 NOTE — PROGRESS NOTES
HPI:    Patient ID: Jai Coronado is a 36year old female. Patient presents for skin tag removal on her neck, right upper thigh and under right arm. No irritation noted. No tenderness or draining noted. No allergies to medications noted.  Nan Metz vitamin D  -Advised to give more time for the hair to grow back. -If not improving, needs to return for further testing to ensure that no other etiology is causing the hair loss.   -Labs reviewed.  Iron is low and so is vitamin D. TSH was normal.   -To ca

## 2021-04-17 ENCOUNTER — MED REC SCAN ONLY (OUTPATIENT)
Dept: INTERNAL MEDICINE CLINIC | Facility: CLINIC | Age: 41
End: 2021-04-17

## 2021-04-17 PROBLEM — F32.A ANXIETY AND DEPRESSION: Status: ACTIVE | Noted: 2021-04-17

## 2021-04-17 PROBLEM — O99.891 ZIKA VIRUS EXPOSURE AFFECTING PREGNANCY (HCC): Status: RESOLVED | Noted: 2017-10-19 | Resolved: 2021-04-17

## 2021-04-17 PROBLEM — O48.0 POST TERM PREGNANCY AT 41 WEEKS GESTATION (HCC): Status: RESOLVED | Noted: 2018-01-27 | Resolved: 2021-04-17

## 2021-04-17 PROBLEM — Z20.821 ZIKA VIRUS EXPOSURE AFFECTING PREGNANCY: Status: RESOLVED | Noted: 2017-10-19 | Resolved: 2021-04-17

## 2021-04-17 PROBLEM — O48.0 POST TERM PREGNANCY AT 41 WEEKS GESTATION: Status: RESOLVED | Noted: 2018-01-27 | Resolved: 2021-04-17

## 2021-04-17 PROBLEM — F41.9 ANXIETY AND DEPRESSION: Status: ACTIVE | Noted: 2021-04-17

## 2021-04-17 PROBLEM — O48.0 POST-DATES PREGNANCY: Status: RESOLVED | Noted: 2018-01-25 | Resolved: 2021-04-17

## 2021-04-17 PROBLEM — O48.0 POST-DATES PREGNANCY (HCC): Status: RESOLVED | Noted: 2018-01-25 | Resolved: 2021-04-17

## 2021-04-17 PROBLEM — Z3A.41 POST TERM PREGNANCY AT 41 WEEKS GESTATION: Status: RESOLVED | Noted: 2018-01-27 | Resolved: 2021-04-17

## 2021-04-17 PROBLEM — Z34.90 PREGNANCY: Status: RESOLVED | Noted: 2018-01-27 | Resolved: 2021-04-17

## 2021-04-17 PROBLEM — Z20.821 ZIKA VIRUS EXPOSURE AFFECTING PREGNANCY (HCC): Status: RESOLVED | Noted: 2017-10-19 | Resolved: 2021-04-17

## 2021-04-17 PROBLEM — O99.891 ZIKA VIRUS EXPOSURE AFFECTING PREGNANCY: Status: RESOLVED | Noted: 2017-10-19 | Resolved: 2021-04-17

## 2021-04-17 PROBLEM — Z34.90 PREGNANCY (HCC): Status: RESOLVED | Noted: 2018-01-27 | Resolved: 2021-04-17

## 2021-04-17 PROBLEM — O09.523 ELDERLY MULTIGRAVIDA IN THIRD TRIMESTER: Status: RESOLVED | Noted: 2017-08-23 | Resolved: 2021-04-17

## 2021-04-17 PROBLEM — Z3A.41 POST TERM PREGNANCY AT 41 WEEKS GESTATION (HCC): Status: RESOLVED | Noted: 2018-01-27 | Resolved: 2021-04-17

## 2021-04-17 PROBLEM — O09.523 ELDERLY MULTIGRAVIDA IN THIRD TRIMESTER (HCC): Status: RESOLVED | Noted: 2017-08-23 | Resolved: 2021-04-17

## 2021-04-17 NOTE — PROGRESS NOTES
Patient ID: Edy Odell is a 36year old female. Patient presents with:  Stress: Pt reports to office due to therapist wanting to start mediations for anxiety, here to discuss with Dr. Evans Knowles.          HISTORY OF PRESENT ILLNESS:   KAREN Ayers on file    Social History Narrative      Not on file    Social Determinants of Health  Financial Resource Strain:       Difficulty of Paying Living Expenses:   Food Insecurity:       Worried About [address] in the Last Year:       Ran Out of Food Routine Follow-Up. This note was prepared using [employer] voice recognition dictation software. As a result errors may occur. When identified these errors have been corrected.  While every attempt is made to correct errors during dictation discrepjames

## 2021-04-17 NOTE — PATIENT INSTRUCTIONS
Depression  Depression is one of the most common mental health problems today. It is not just a state of unhappiness or sadness. It is a true disease. The cause seems to be linked to a drop in chemicals that transmit signals in the brain.  Having a family (low in saturated fat and high in fruits and vegetables). Exercise at least 3 times a week for 30 minutes. Even mild to moderate exercise (like brisk walking) can make you feel better.   · Don't make major decisions, such as a job change, a divorce, or a ma hear  · Seeing things that others do not see  · Not sleeping or eating for 3 days in a row  · Having friends or family express concern over your behavior and ask you to seek help  Anitra last reviewed this educational content on 7/1/2020 © 2000-2020 The pain  · fast, irregular heartbeat  · feeling faint or lightheaded, falls  · feeling agitated, angry, or irritable  · hallucination, loss of contact with reality  · loss of balance or coordination  · loss of memory  · painful or prolonged erections  · restl like Ana's wort, kava kava, valerian  · tramadol  · tryptophan  What if I miss a dose? If you miss a dose, take it as soon as you can. If it is almost time for your next dose, take only that dose. Do not take double or extra doses.   Where should I ke affects you. Do not stand or sit up quickly, especially if you are an older patient. This reduces the risk of dizzy or fainting spells. Alcohol may interfere with the effect of this medicine. Avoid alcoholic drinks. Your mouth may get dry.  Chewing sugarle

## 2021-05-15 NOTE — PROGRESS NOTES
Patient ID: Abe Davison is a 36year old female. Patient presents with: Follow - Up       HISTORY OF PRESENT ILLNESS:   HPI  Patient presents for above. Here for follow-up of her anxiety and depression.   Started on Lexapro 10 mg 1 month ag Health  Financial Resource Strain:       Difficulty of Paying Living Expenses:   Food Insecurity:       Worried About [address] in the Last Year:       Ran Out of Food in the Last Year:   Transportation Needs:       Lack of Transportation ([employer] frequency. Return in about 3 months (around 8/15/2021) for Routine Follow-Up. This note was prepared using [employer] voice recognition dictation software. As a result errors may occur. When identified these errors have been corrected.  While ever

## 2021-08-21 ENCOUNTER — OFFICE VISIT (OUTPATIENT)
Dept: INTERNAL MEDICINE CLINIC | Facility: CLINIC | Age: 41
End: 2021-08-21
Payer: COMMERCIAL

## 2021-08-21 VITALS
RESPIRATION RATE: 16 BRPM | HEART RATE: 80 BPM | HEIGHT: 64 IN | WEIGHT: 144 LBS | DIASTOLIC BLOOD PRESSURE: 82 MMHG | BODY MASS INDEX: 24.59 KG/M2 | SYSTOLIC BLOOD PRESSURE: 126 MMHG

## 2021-08-21 DIAGNOSIS — F32.A ANXIETY AND DEPRESSION: ICD-10-CM

## 2021-08-21 DIAGNOSIS — R79.89 LOW VITAMIN D LEVEL: ICD-10-CM

## 2021-08-21 DIAGNOSIS — Z00.00 ANNUAL PHYSICAL EXAM: Primary | ICD-10-CM

## 2021-08-21 DIAGNOSIS — F41.9 ANXIETY AND DEPRESSION: ICD-10-CM

## 2021-08-21 DIAGNOSIS — M25.562 ACUTE PAIN OF LEFT KNEE: ICD-10-CM

## 2021-08-21 PROCEDURE — 99396 PREV VISIT EST AGE 40-64: CPT | Performed by: INTERNAL MEDICINE

## 2021-08-21 PROCEDURE — 3079F DIAST BP 80-89 MM HG: CPT | Performed by: INTERNAL MEDICINE

## 2021-08-21 PROCEDURE — 3008F BODY MASS INDEX DOCD: CPT | Performed by: INTERNAL MEDICINE

## 2021-08-21 PROCEDURE — 3074F SYST BP LT 130 MM HG: CPT | Performed by: INTERNAL MEDICINE

## 2021-08-21 NOTE — PROGRESS NOTES
Patient ID: Sydnee Mills is a 36year old female. Patient presents with:  Depression  Pain: left knee       HISTORY OF PRESENT ILLNESS:   HPI  Patient presents for above. Here for complete physical and to discuss other issues.     Complaining name: Not on file      Number of children: Not on file      Years of education: Not on file      Highest education level: Not on file    Occupational History      Not on file    Tobacco Use      Smoking status: Never Smoker      Smokeless tobacco: Never Us Mouth/Throat:      Mouth: Mucous membranes are moist.      Pharynx: Oropharynx is clear. Eyes:      General: No scleral icterus. Right eye: No discharge. Left eye: No discharge. Extraocular Movements: Extraocular movements intact. standpoint. May be able to titrate medication downwards. 4. Low vitamin D level  · VITAMIN D; Future    Return in about 6 months (around 2/21/2022) for Routine Follow-Up.     This note was prepared using [employer] voice recognition dictation softwa

## 2021-08-21 NOTE — PATIENT INSTRUCTIONS
Prevention Guidelines, Women Ages 36 to 52  Screening tests and vaccines are an important part of managing your health. A screening test is done to find diseases in people who don't have any symptoms.  The goal is to find a disease early so lifestyle change sigmoidoscopy every 5 years, or  · Colonoscopy every 10 years, or  · CT colonography (virtual colonoscopy) every 5 years, or  · Yearly fecal occult blood test, or  · Yearly fecal immunochemical test every year, or  · Stool DNA test, every 3 years  If you c least 4 weeks after the first dose   Hepatitis A Women at increased risk for infection–talk with your healthcare provider 2 doses given 6 months apart   Hepatitis B Women at increased risk for infection–talk with your healthcare provider 3 doses over 6 mon [facility]  Date Last Reviewed: 11/1/2017  © 2306-2979 [employer] [address]. [address] [facility], [facility] [employer] [place]. All rights reserved. This information is not intended as a substitute for professional medical care.  Always

## 2021-09-02 ENCOUNTER — PATIENT MESSAGE (OUTPATIENT)
Dept: INTERNAL MEDICINE CLINIC | Facility: CLINIC | Age: 41
End: 2021-09-02

## 2021-09-02 DIAGNOSIS — F41.9 ANXIETY AND DEPRESSION: ICD-10-CM

## 2021-09-02 DIAGNOSIS — F32.A ANXIETY AND DEPRESSION: ICD-10-CM

## 2021-09-03 RX ORDER — ESCITALOPRAM OXALATE 10 MG/1
10 TABLET ORAL DAILY
Qty: 90 TABLET | Refills: 1 | Status: SHIPPED | OUTPATIENT
Start: 2021-09-03

## 2021-09-03 NOTE — TELEPHONE ENCOUNTER
From: Gianna Stone  To: Yenny Chery MD  Sent: 9/2/2021 7:56 AM CDT  Subject: Prescription Question    Good morning Dr.    I don't have any more refills for my medication. Can you please send the request to the pharmacy you have on file.

## 2021-09-03 NOTE — TELEPHONE ENCOUNTER
Refill passed per [facility] protocol. Requested Prescriptions   Pending Prescriptions Disp Refills    escitalopram 10 MG Oral Tab 90 tablet 0     Sig: Take 1 tablet (10 mg total) by mouth daily.         Psychiatric Non-Scheduled (Anti-Anxiety) Passed

## 2022-03-13 ENCOUNTER — LAB ENCOUNTER (OUTPATIENT)
Dept: LAB | Facility: HOSPITAL | Age: 42
End: 2022-03-13
Attending: INTERNAL MEDICINE
Payer: COMMERCIAL

## 2022-03-13 DIAGNOSIS — R79.89 LOW VITAMIN D LEVEL: ICD-10-CM

## 2022-03-13 DIAGNOSIS — Z00.00 ANNUAL PHYSICAL EXAM: ICD-10-CM

## 2022-03-13 LAB
ALBUMIN SERPL-MCNC: 3.6 G/DL (ref 3.4–5)
ALBUMIN/GLOB SERPL: 1.1 {RATIO} (ref 1–2)
ALP LIVER SERPL-CCNC: 73 U/L
ALT SERPL-CCNC: 25 U/L
ANION GAP SERPL CALC-SCNC: 5 MMOL/L (ref 0–18)
AST SERPL-CCNC: 15 U/L (ref 15–37)
BASOPHILS # BLD AUTO: 0.03 X10(3) UL (ref 0–0.2)
BASOPHILS NFR BLD AUTO: 0.6 %
BILIRUB SERPL-MCNC: 0.4 MG/DL (ref 0.1–2)
BUN BLD-MCNC: 13 MG/DL (ref 7–18)
BUN/CREAT SERPL: 18.8 (ref 10–20)
CALCIUM BLD-MCNC: 8.6 MG/DL (ref 8.5–10.1)
CHLORIDE SERPL-SCNC: 108 MMOL/L (ref 98–112)
CHOLEST SERPL-MCNC: 217 MG/DL (ref ?–200)
CO2 SERPL-SCNC: 26 MMOL/L (ref 21–32)
CREAT BLD-MCNC: 0.69 MG/DL
DEPRECATED RDW RBC AUTO: 46.2 FL (ref 35.1–46.3)
EOSINOPHIL # BLD AUTO: 0.05 X10(3) UL (ref 0–0.7)
EOSINOPHIL NFR BLD AUTO: 0.9 %
ERYTHROCYTE [DISTWIDTH] IN BLOOD BY AUTOMATED COUNT: 14.8 % (ref 11–15)
FASTING PATIENT LIPID ANSWER: YES
FASTING STATUS PATIENT QL REPORTED: YES
GLOBULIN PLAS-MCNC: 3.4 G/DL (ref 2.8–4.4)
GLUCOSE BLD-MCNC: 91 MG/DL (ref 70–99)
HCT VFR BLD AUTO: 37.9 %
HDLC SERPL-MCNC: 42 MG/DL (ref 40–59)
HGB BLD-MCNC: 11.5 G/DL
IMM GRANULOCYTES # BLD AUTO: 0.01 X10(3) UL (ref 0–1)
IMM GRANULOCYTES NFR BLD: 0.2 %
LDLC SERPL CALC-MCNC: 148 MG/DL (ref ?–100)
LYMPHOCYTES # BLD AUTO: 1.65 X10(3) UL (ref 1–4)
LYMPHOCYTES NFR BLD AUTO: 30.6 %
MCH RBC QN AUTO: 25.7 PG (ref 26–34)
MCHC RBC AUTO-ENTMCNC: 30.3 G/DL (ref 31–37)
MCV RBC AUTO: 84.6 FL
MONOCYTES # BLD AUTO: 0.6 X10(3) UL (ref 0.1–1)
MONOCYTES NFR BLD AUTO: 11.1 %
NEUTROPHILS # BLD AUTO: 3.06 X10 (3) UL (ref 1.5–7.7)
NEUTROPHILS # BLD AUTO: 3.06 X10(3) UL (ref 1.5–7.7)
NEUTROPHILS NFR BLD AUTO: 56.6 %
NONHDLC SERPL-MCNC: 175 MG/DL (ref ?–130)
OSMOLALITY SERPL CALC.SUM OF ELEC: 288 MOSM/KG (ref 275–295)
PLATELET # BLD AUTO: 282 10(3)UL (ref 150–450)
POTASSIUM SERPL-SCNC: 4.2 MMOL/L (ref 3.5–5.1)
PROT SERPL-MCNC: 7 G/DL (ref 6.4–8.2)
RBC # BLD AUTO: 4.48 X10(6)UL
SODIUM SERPL-SCNC: 139 MMOL/L (ref 136–145)
TRIGL SERPL-MCNC: 149 MG/DL (ref 30–149)
TSI SER-ACNC: 0.93 MIU/ML (ref 0.36–3.74)
VIT D+METAB SERPL-MCNC: 16.9 NG/ML (ref 30–100)
VLDLC SERPL CALC-MCNC: 28 MG/DL (ref 0–30)
WBC # BLD AUTO: 5.4 X10(3) UL (ref 4–11)

## 2022-03-13 PROCEDURE — 84443 ASSAY THYROID STIM HORMONE: CPT

## 2022-03-13 PROCEDURE — 80061 LIPID PANEL: CPT

## 2022-03-13 PROCEDURE — 82306 VITAMIN D 25 HYDROXY: CPT

## 2022-03-13 PROCEDURE — 80053 COMPREHEN METABOLIC PANEL: CPT

## 2022-03-13 PROCEDURE — 36415 COLL VENOUS BLD VENIPUNCTURE: CPT

## 2022-03-13 PROCEDURE — 85025 COMPLETE CBC W/AUTO DIFF WBC: CPT

## 2022-03-14 ENCOUNTER — PATIENT MESSAGE (OUTPATIENT)
Dept: INTERNAL MEDICINE CLINIC | Facility: CLINIC | Age: 42
End: 2022-03-14

## 2022-03-14 RX ORDER — ERGOCALCIFEROL 1.25 MG/1
50000 CAPSULE ORAL WEEKLY
Qty: 8 CAPSULE | Refills: 0 | Status: SHIPPED | OUTPATIENT
Start: 2022-03-14 | End: 2022-05-03

## 2022-03-15 ENCOUNTER — HOSPITAL ENCOUNTER (OUTPATIENT)
Dept: MAMMOGRAPHY | Facility: HOSPITAL | Age: 42
Discharge: HOME | End: 2022-03-15
Attending: INTERNAL MEDICINE
Payer: COMMERCIAL

## 2022-03-15 DIAGNOSIS — Z00.00 ANNUAL PHYSICAL EXAM: ICD-10-CM

## 2022-03-15 PROCEDURE — 77063 BREAST TOMOSYNTHESIS BI: CPT | Performed by: INTERNAL MEDICINE

## 2022-03-15 PROCEDURE — 77067 SCR MAMMO BI INCL CAD: CPT | Performed by: INTERNAL MEDICINE

## 2022-03-15 NOTE — TELEPHONE ENCOUNTER
From: Gianna [employer]Akamai Home [employer]  To: Beryl Kingsley MD  Sent: 3/14/2022 11:44 AM CDT  Subject: Lab test     Hi Dr. River Mittal I am ok if you can please send my prescription to the pharmacy on file I will appreciate it.

## 2022-03-25 ENCOUNTER — HOSPITAL ENCOUNTER (OUTPATIENT)
Dept: MAMMOGRAPHY | Facility: HOSPITAL | Age: 42
Discharge: HOME | End: 2022-03-25
Attending: PHYSICIAN ASSISTANT
Payer: COMMERCIAL

## 2022-03-25 ENCOUNTER — HOSPITAL ENCOUNTER (OUTPATIENT)
Dept: ULTRASOUND IMAGING | Facility: HOSPITAL | Age: 42
Discharge: HOME | End: 2022-03-25
Attending: PHYSICIAN ASSISTANT
Payer: COMMERCIAL

## 2022-03-25 DIAGNOSIS — R92.8 ABNORMAL MAMMOGRAM: ICD-10-CM

## 2022-03-25 PROCEDURE — 77066 DX MAMMO INCL CAD BI: CPT | Performed by: PHYSICIAN ASSISTANT

## 2022-03-25 PROCEDURE — 77062 BREAST TOMOSYNTHESIS BI: CPT | Performed by: PHYSICIAN ASSISTANT

## 2022-03-25 PROCEDURE — 76642 ULTRASOUND BREAST LIMITED: CPT | Performed by: PHYSICIAN ASSISTANT

## 2022-04-15 ENCOUNTER — TELEPHONE (OUTPATIENT)
Dept: PHYSICAL THERAPY | Facility: HOSPITAL | Age: 42
End: 2022-04-15

## 2022-04-19 ENCOUNTER — TELEPHONE (OUTPATIENT)
Dept: PHYSICAL THERAPY | Age: 42
End: 2022-04-19

## 2022-04-19 ENCOUNTER — APPOINTMENT (OUTPATIENT)
Dept: PHYSICAL THERAPY | Age: 42
End: 2022-04-19
Attending: INTERNAL MEDICINE
Payer: COMMERCIAL

## 2022-04-20 NOTE — TELEPHONE ENCOUNTER
Pt no show evaluation on 4/19/22. Called patient, LM. Asked patient to call clinic to confirm next appointment on 4/21/22.

## 2022-04-21 ENCOUNTER — OFFICE VISIT (OUTPATIENT)
Dept: PHYSICAL THERAPY | Age: 42
End: 2022-04-21
Attending: INTERNAL MEDICINE
Payer: COMMERCIAL

## 2022-04-21 PROCEDURE — 97110 THERAPEUTIC EXERCISES: CPT

## 2022-04-21 PROCEDURE — 97162 PT EVAL MOD COMPLEX 30 MIN: CPT

## 2022-04-26 ENCOUNTER — OFFICE VISIT (OUTPATIENT)
Dept: PHYSICAL THERAPY | Age: 42
End: 2022-04-26
Attending: INTERNAL MEDICINE
Payer: COMMERCIAL

## 2022-04-26 PROCEDURE — 97110 THERAPEUTIC EXERCISES: CPT

## 2022-04-26 PROCEDURE — 97140 MANUAL THERAPY 1/> REGIONS: CPT

## 2022-04-28 ENCOUNTER — APPOINTMENT (OUTPATIENT)
Dept: PHYSICAL THERAPY | Age: 42
End: 2022-04-28
Attending: INTERNAL MEDICINE
Payer: COMMERCIAL

## 2022-05-03 ENCOUNTER — OFFICE VISIT (OUTPATIENT)
Dept: PHYSICAL THERAPY | Age: 42
End: 2022-05-03
Attending: INTERNAL MEDICINE
Payer: COMMERCIAL

## 2022-05-03 PROCEDURE — 97110 THERAPEUTIC EXERCISES: CPT

## 2022-05-03 PROCEDURE — 97140 MANUAL THERAPY 1/> REGIONS: CPT

## 2022-05-05 ENCOUNTER — OFFICE VISIT (OUTPATIENT)
Dept: PHYSICAL THERAPY | Age: 42
End: 2022-05-05
Attending: INTERNAL MEDICINE
Payer: COMMERCIAL

## 2022-05-05 PROCEDURE — 97110 THERAPEUTIC EXERCISES: CPT

## 2022-05-10 ENCOUNTER — OFFICE VISIT (OUTPATIENT)
Dept: PHYSICAL THERAPY | Age: 42
End: 2022-05-10
Attending: INTERNAL MEDICINE
Payer: COMMERCIAL

## 2022-05-10 PROCEDURE — 97110 THERAPEUTIC EXERCISES: CPT

## 2022-05-12 ENCOUNTER — OFFICE VISIT (OUTPATIENT)
Dept: PHYSICAL THERAPY | Age: 42
End: 2022-05-12
Attending: INTERNAL MEDICINE
Payer: COMMERCIAL

## 2022-05-12 PROCEDURE — 97110 THERAPEUTIC EXERCISES: CPT

## 2022-06-02 ENCOUNTER — APPOINTMENT (OUTPATIENT)
Dept: PHYSICAL THERAPY | Age: 42
End: 2022-06-02
Attending: INTERNAL MEDICINE
Payer: COMMERCIAL

## 2022-06-07 ENCOUNTER — OFFICE VISIT (OUTPATIENT)
Dept: PHYSICAL THERAPY | Age: 42
End: 2022-06-07
Attending: INTERNAL MEDICINE
Payer: COMMERCIAL

## 2022-06-07 PROCEDURE — 97110 THERAPEUTIC EXERCISES: CPT

## 2023-01-17 ENCOUNTER — OFFICE VISIT (OUTPATIENT)
Dept: OBGYN CLINIC | Facility: CLINIC | Age: 43
End: 2023-01-17

## 2023-01-17 VITALS
SYSTOLIC BLOOD PRESSURE: 119 MMHG | WEIGHT: 147 LBS | HEIGHT: 64 IN | DIASTOLIC BLOOD PRESSURE: 78 MMHG | BODY MASS INDEX: 25.1 KG/M2

## 2023-01-17 DIAGNOSIS — N89.8 VAGINAL DISCHARGE: ICD-10-CM

## 2023-01-17 DIAGNOSIS — Z01.419 WOMEN'S ANNUAL ROUTINE GYNECOLOGICAL EXAMINATION: Primary | ICD-10-CM

## 2023-01-17 DIAGNOSIS — Z11.3 SCREENING EXAMINATION FOR SEXUALLY TRANSMITTED DISEASE: ICD-10-CM

## 2023-01-17 DIAGNOSIS — Z12.4 SCREENING FOR MALIGNANT NEOPLASM OF CERVIX: ICD-10-CM

## 2023-01-17 PROCEDURE — 3078F DIAST BP <80 MM HG: CPT | Performed by: NURSE PRACTITIONER

## 2023-01-17 PROCEDURE — 99386 PREV VISIT NEW AGE 40-64: CPT | Performed by: NURSE PRACTITIONER

## 2023-01-17 PROCEDURE — 3008F BODY MASS INDEX DOCD: CPT | Performed by: NURSE PRACTITIONER

## 2023-01-17 PROCEDURE — 3074F SYST BP LT 130 MM HG: CPT | Performed by: NURSE PRACTITIONER

## 2023-01-18 LAB — HPV I/H RISK 1 DNA SPEC QL NAA+PROBE: NEGATIVE

## 2023-01-20 LAB
GENITAL VAGINOSIS SCREEN: NEGATIVE
TRICHOMONAS SCREEN: NEGATIVE

## 2023-01-31 ENCOUNTER — OFFICE VISIT (OUTPATIENT)
Dept: INTERNAL MEDICINE CLINIC | Facility: CLINIC | Age: 43
End: 2023-01-31
Payer: COMMERCIAL

## 2023-01-31 VITALS
WEIGHT: 149 LBS | DIASTOLIC BLOOD PRESSURE: 66 MMHG | SYSTOLIC BLOOD PRESSURE: 118 MMHG | HEIGHT: 64 IN | BODY MASS INDEX: 25.44 KG/M2 | HEART RATE: 73 BPM

## 2023-01-31 DIAGNOSIS — M54.2 CERVICALGIA: ICD-10-CM

## 2023-01-31 DIAGNOSIS — Z00.00 ANNUAL PHYSICAL EXAM: Primary | ICD-10-CM

## 2023-01-31 DIAGNOSIS — G89.29 CHRONIC PAIN OF LEFT KNEE: ICD-10-CM

## 2023-01-31 DIAGNOSIS — Z28.21 IMMUNIZATION NOT CARRIED OUT BECAUSE OF PATIENT REFUSAL: ICD-10-CM

## 2023-01-31 DIAGNOSIS — E55.9 VITAMIN D DEFICIENCY: ICD-10-CM

## 2023-01-31 DIAGNOSIS — F41.9 ANXIETY AND DEPRESSION: ICD-10-CM

## 2023-01-31 DIAGNOSIS — F32.A ANXIETY AND DEPRESSION: ICD-10-CM

## 2023-01-31 DIAGNOSIS — Z83.79 FAMILY HISTORY OF FATTY LIVER: ICD-10-CM

## 2023-01-31 DIAGNOSIS — M25.562 CHRONIC PAIN OF LEFT KNEE: ICD-10-CM

## 2023-01-31 PROBLEM — R79.89 LOW VITAMIN D LEVEL: Status: RESOLVED | Noted: 2019-07-02 | Resolved: 2023-01-31

## 2023-01-31 PROCEDURE — 3008F BODY MASS INDEX DOCD: CPT | Performed by: INTERNAL MEDICINE

## 2023-01-31 PROCEDURE — 3074F SYST BP LT 130 MM HG: CPT | Performed by: INTERNAL MEDICINE

## 2023-01-31 PROCEDURE — 3078F DIAST BP <80 MM HG: CPT | Performed by: INTERNAL MEDICINE

## 2023-01-31 PROCEDURE — 99396 PREV VISIT EST AGE 40-64: CPT | Performed by: INTERNAL MEDICINE

## 2023-01-31 RX ORDER — ESCITALOPRAM OXALATE 5 MG/5ML
10 SOLUTION ORAL DAILY
Qty: 90 EACH | Refills: 0 | Status: SHIPPED | OUTPATIENT
Start: 2023-01-31 | End: 2023-02-03 | Stop reason: ALTCHOICE

## 2023-02-20 ENCOUNTER — MED REC SCAN ONLY (OUTPATIENT)
Dept: INTERNAL MEDICINE CLINIC | Facility: CLINIC | Age: 43
End: 2023-02-20

## 2023-02-22 ENCOUNTER — PATIENT MESSAGE (OUTPATIENT)
Dept: INTERNAL MEDICINE CLINIC | Facility: CLINIC | Age: 43
End: 2023-02-22

## 2023-03-11 ENCOUNTER — TELEPHONE (OUTPATIENT)
Dept: INTERNAL MEDICINE CLINIC | Facility: CLINIC | Age: 43
End: 2023-03-11

## 2023-03-11 NOTE — TELEPHONE ENCOUNTER
Patient at [facility], lab orders not in system Faxed to number provided [phone], confirmation received.

## 2023-03-13 ENCOUNTER — MED REC SCAN ONLY (OUTPATIENT)
Dept: INTERNAL MEDICINE CLINIC | Facility: CLINIC | Age: 43
End: 2023-03-13

## 2023-03-29 ENCOUNTER — PATIENT MESSAGE (OUTPATIENT)
Dept: INTERNAL MEDICINE CLINIC | Facility: CLINIC | Age: 43
End: 2023-03-29

## 2023-03-29 DIAGNOSIS — R92.8 ABNORMAL MAMMOGRAM OF BOTH BREASTS: Primary | ICD-10-CM

## 2023-03-29 PROBLEM — K76.0 FATTY LIVER: Status: ACTIVE | Noted: 2023-03-29

## 2023-04-25 ENCOUNTER — OFFICE VISIT (OUTPATIENT)
Dept: INTERNAL MEDICINE CLINIC | Facility: CLINIC | Age: 43
End: 2023-04-25

## 2023-04-25 VITALS
HEIGHT: 64 IN | SYSTOLIC BLOOD PRESSURE: 120 MMHG | OXYGEN SATURATION: 100 % | HEART RATE: 77 BPM | DIASTOLIC BLOOD PRESSURE: 68 MMHG | WEIGHT: 151 LBS | BODY MASS INDEX: 25.78 KG/M2

## 2023-04-25 DIAGNOSIS — E55.9 VITAMIN D DEFICIENCY: ICD-10-CM

## 2023-04-25 DIAGNOSIS — F41.9 ANXIETY AND DEPRESSION: ICD-10-CM

## 2023-04-25 DIAGNOSIS — E78.00 HYPERCHOLESTEROLEMIA: Primary | ICD-10-CM

## 2023-04-25 DIAGNOSIS — Z23 NEED FOR VACCINATION: ICD-10-CM

## 2023-04-25 DIAGNOSIS — F32.A ANXIETY AND DEPRESSION: ICD-10-CM

## 2023-04-25 PROCEDURE — 3074F SYST BP LT 130 MM HG: CPT | Performed by: INTERNAL MEDICINE

## 2023-04-25 PROCEDURE — 3078F DIAST BP <80 MM HG: CPT | Performed by: INTERNAL MEDICINE

## 2023-04-25 PROCEDURE — 99214 OFFICE O/P EST MOD 30 MIN: CPT | Performed by: INTERNAL MEDICINE

## 2023-04-25 PROCEDURE — 90715 TDAP VACCINE 7 YRS/> IM: CPT | Performed by: INTERNAL MEDICINE

## 2023-04-25 PROCEDURE — 90471 IMMUNIZATION ADMIN: CPT | Performed by: INTERNAL MEDICINE

## 2023-04-25 PROCEDURE — 3008F BODY MASS INDEX DOCD: CPT | Performed by: INTERNAL MEDICINE

## 2023-04-25 RX ORDER — ROSUVASTATIN CALCIUM 5 MG/1
5 TABLET, COATED ORAL NIGHTLY
Qty: 90 TABLET | Refills: 0 | Status: SHIPPED | OUTPATIENT
Start: 2023-04-25

## 2023-04-25 RX ORDER — ERGOCALCIFEROL 1.25 MG/1
50000 CAPSULE ORAL WEEKLY
Qty: 8 CAPSULE | Refills: 0 | Status: SHIPPED | OUTPATIENT
Start: 2023-04-25 | End: 2023-06-14

## 2023-04-26 NOTE — TELEPHONE ENCOUNTER
Faxed to m, completed sheet recieved Negative for dysphoric mood and suicidal ideas. The patient is not nervous/anxious. Physical Exam  Vitals reviewed. Constitutional:       Appearance: He is normal weight. HENT:      Head: Normocephalic. Right Ear: Tympanic membrane, ear canal and external ear normal.      Left Ear: Tympanic membrane, ear canal and external ear normal.      Nose: Nose normal.      Mouth/Throat:      Mouth: Mucous membranes are moist.      Pharynx: Oropharynx is clear. Eyes:      Extraocular Movements: Extraocular movements intact. Conjunctiva/sclera: Conjunctivae normal.      Pupils: Pupils are equal, round, and reactive to light. Cardiovascular:      Rate and Rhythm: Normal rate and regular rhythm. Heart sounds: Normal heart sounds. Pulmonary:      Effort: Pulmonary effort is normal.      Breath sounds: Normal breath sounds. Abdominal:      General: Abdomen is flat. Bowel sounds are normal.      Palpations: There is no mass. Tenderness: There is no abdominal tenderness. Musculoskeletal:      Left lower leg: No edema. Lymphadenopathy:      Cervical: No cervical adenopathy. Neurological:      General: No focal deficit present. Mental Status: He is alert and oriented to person, place, and time. Psychiatric:         Mood and Affect: Mood normal.         Thought Content: Thought content normal.         Judgment: Judgment normal.           See above plan.          Alecia Julien, APRN - CNP

## 2023-09-12 ENCOUNTER — MED REC SCAN ONLY (OUTPATIENT)
Dept: INTERNAL MEDICINE CLINIC | Facility: CLINIC | Age: 43
End: 2023-09-12

## 2023-11-15 ENCOUNTER — NURSE TRIAGE (OUTPATIENT)
Dept: INTERNAL MEDICINE CLINIC | Facility: CLINIC | Age: 43
End: 2023-11-15

## 2023-11-15 ENCOUNTER — OFFICE VISIT (OUTPATIENT)
Dept: INTERNAL MEDICINE CLINIC | Facility: CLINIC | Age: 43
End: 2023-11-15

## 2023-11-15 VITALS
TEMPERATURE: 98 F | HEART RATE: 76 BPM | SYSTOLIC BLOOD PRESSURE: 134 MMHG | OXYGEN SATURATION: 98 % | WEIGHT: 150.63 LBS | BODY MASS INDEX: 25.71 KG/M2 | HEIGHT: 64 IN | DIASTOLIC BLOOD PRESSURE: 79 MMHG

## 2023-11-15 DIAGNOSIS — J06.9 ACUTE URI: Primary | ICD-10-CM

## 2023-11-15 PROCEDURE — 3075F SYST BP GE 130 - 139MM HG: CPT | Performed by: INTERNAL MEDICINE

## 2023-11-15 PROCEDURE — 99213 OFFICE O/P EST LOW 20 MIN: CPT | Performed by: INTERNAL MEDICINE

## 2023-11-15 PROCEDURE — 3078F DIAST BP <80 MM HG: CPT | Performed by: INTERNAL MEDICINE

## 2023-11-15 PROCEDURE — 3008F BODY MASS INDEX DOCD: CPT | Performed by: INTERNAL MEDICINE

## 2023-11-15 RX ORDER — ALBUTEROL SULFATE 90 UG/1
2 AEROSOL, METERED RESPIRATORY (INHALATION) EVERY 6 HOURS PRN
Qty: 1 EACH | Refills: 0 | Status: SHIPPED | OUTPATIENT
Start: 2023-11-15

## 2023-11-15 RX ORDER — PREDNISONE 20 MG/1
TABLET ORAL
Qty: 10 TABLET | Refills: 0 | Status: SHIPPED | OUTPATIENT
Start: 2023-11-15

## 2023-11-15 RX ORDER — AZITHROMYCIN 250 MG/1
TABLET, FILM COATED ORAL
Qty: 6 TABLET | Refills: 0 | Status: SHIPPED | OUTPATIENT
Start: 2023-11-15 | End: 2023-11-20

## 2023-11-15 NOTE — PATIENT INSTRUCTIONS
Please do a home COVID test.  Take Zithromax daily for 5 days. Take prednisone 20 mg 2 tablets daily with food for 5 days. Use albuterol when needed for wheezing. Call if no better.

## 2023-11-15 NOTE — TELEPHONE ENCOUNTER
Action Requested: Summary for Provider     []  Critical Lab, Recommendations Needed  [] Need Additional Advice  []   FYI    []   Need Orders  [] Need Medications Sent to Pharmacy  []  Other     SUMMARY: Per protocol advised : Office visit   Future Appointments   Date Time Provider Holley Padilla   11/15/2023  3:30 PM Ramona Mcnamara MD [facility] RAKESH Marshall     Reason for call: Wheezing  Onset: Data Unavailable  Patient calling ( identified name and  ) chanel has adam having cold symptoms of cough, chest congestion , slight wheezing ,   back ache to thoracic area ,  some SOB with exertion (Like stair climbing ) did not check her temp ; onset of one week       Has tried Mucinex, Nyquill with very slight relief     See care advice given     Patient verbalizes understanding and agrees with plan.        Reason for Disposition   MILD difficulty breathing (e.g., minimal/no SOB at rest, SOB with walking, pulse < 100) of new-onset or worse than normal    Protocols used: Breathing Difficulty-A-[place]

## 2024-02-05 ENCOUNTER — LAB ENCOUNTER (OUTPATIENT)
Dept: LAB | Facility: REFERENCE LAB | Age: 44
End: 2024-02-05
Attending: INTERNAL MEDICINE
Payer: COMMERCIAL

## 2024-02-05 PROCEDURE — 85025 COMPLETE CBC W/AUTO DIFF WBC: CPT | Performed by: INTERNAL MEDICINE

## 2024-02-05 PROCEDURE — 80053 COMPREHEN METABOLIC PANEL: CPT | Performed by: INTERNAL MEDICINE

## 2024-02-05 PROCEDURE — 82728 ASSAY OF FERRITIN: CPT | Performed by: INTERNAL MEDICINE

## 2024-02-05 PROCEDURE — 36415 COLL VENOUS BLD VENIPUNCTURE: CPT | Performed by: INTERNAL MEDICINE

## 2024-02-05 PROCEDURE — 84466 ASSAY OF TRANSFERRIN: CPT | Performed by: INTERNAL MEDICINE

## 2024-02-05 PROCEDURE — 83540 ASSAY OF IRON: CPT | Performed by: INTERNAL MEDICINE

## 2024-02-05 PROCEDURE — 84443 ASSAY THYROID STIM HORMONE: CPT | Performed by: INTERNAL MEDICINE

## 2024-02-06 ENCOUNTER — LAB ENCOUNTER (OUTPATIENT)
Dept: LAB | Facility: HOSPITAL | Age: 44
End: 2024-02-06
Attending: INTERNAL MEDICINE
Payer: COMMERCIAL

## 2024-02-06 DIAGNOSIS — D64.9 ANEMIA, UNSPECIFIED TYPE: ICD-10-CM

## 2024-02-06 LAB
FOLATE SERPL-MCNC: 12.4 NG/ML (ref 5.4–?)
VIT B12 SERPL-MCNC: 468 PG/ML (ref 211–911)

## 2024-02-06 PROCEDURE — 82746 ASSAY OF FOLIC ACID SERUM: CPT

## 2024-02-06 PROCEDURE — 36415 COLL VENOUS BLD VENIPUNCTURE: CPT

## 2024-02-06 PROCEDURE — 82607 VITAMIN B-12: CPT

## 2024-02-09 ENCOUNTER — TELEPHONE (OUTPATIENT)
Dept: INTERNAL MEDICINE CLINIC | Facility: CLINIC | Age: 44
End: 2024-02-09

## 2024-02-09 NOTE — TELEPHONE ENCOUNTER
Received FMLA paperwork from [employer], paperwork was not legible. Faxed back fax cover sheet with patient info requesting clear information packet refaxed. Has appt with  2/15/24 @9:45.

## 2024-02-15 ENCOUNTER — OFFICE VISIT (OUTPATIENT)
Dept: INTERNAL MEDICINE CLINIC | Facility: CLINIC | Age: 44
End: 2024-02-15

## 2024-02-15 ENCOUNTER — LAB ENCOUNTER (OUTPATIENT)
Dept: LAB | Age: 44
End: 2024-02-15
Attending: INTERNAL MEDICINE
Payer: COMMERCIAL

## 2024-02-15 VITALS
TEMPERATURE: 99 F | HEART RATE: 97 BPM | SYSTOLIC BLOOD PRESSURE: 108 MMHG | BODY MASS INDEX: 25.1 KG/M2 | OXYGEN SATURATION: 99 % | HEIGHT: 64 IN | WEIGHT: 147 LBS | RESPIRATION RATE: 20 BRPM | DIASTOLIC BLOOD PRESSURE: 70 MMHG

## 2024-02-15 DIAGNOSIS — K76.0 FATTY LIVER: ICD-10-CM

## 2024-02-15 DIAGNOSIS — D50.8 IRON DEFICIENCY ANEMIA SECONDARY TO INADEQUATE DIETARY IRON INTAKE: ICD-10-CM

## 2024-02-15 DIAGNOSIS — E55.9 VITAMIN D DEFICIENCY: ICD-10-CM

## 2024-02-15 DIAGNOSIS — Z00.00 ANNUAL PHYSICAL EXAM: Primary | ICD-10-CM

## 2024-02-15 DIAGNOSIS — E78.00 HYPERCHOLESTEROLEMIA: ICD-10-CM

## 2024-02-15 DIAGNOSIS — F32.A ANXIETY AND DEPRESSION: ICD-10-CM

## 2024-02-15 DIAGNOSIS — N60.01 BILATERAL BREAST CYSTS: ICD-10-CM

## 2024-02-15 DIAGNOSIS — D50.8 IRON DEFICIENCY ANEMIA SECONDARY TO INADEQUATE DIETARY IRON INTAKE: Primary | ICD-10-CM

## 2024-02-15 DIAGNOSIS — F41.9 ANXIETY AND DEPRESSION: ICD-10-CM

## 2024-02-15 DIAGNOSIS — Z28.21 IMMUNIZATION NOT CARRIED OUT BECAUSE OF PATIENT REFUSAL: ICD-10-CM

## 2024-02-15 DIAGNOSIS — N60.02 BILATERAL BREAST CYSTS: ICD-10-CM

## 2024-02-15 PROBLEM — D50.9 IRON DEFICIENCY ANEMIA: Status: ACTIVE | Noted: 2024-02-15

## 2024-02-15 LAB
CHOLEST SERPL-MCNC: 194 MG/DL (ref ?–200)
FASTING PATIENT LIPID ANSWER: NO
HDLC SERPL-MCNC: 47 MG/DL (ref 40–59)
LDLC SERPL CALC-MCNC: 124 MG/DL (ref ?–100)
NONHDLC SERPL-MCNC: 147 MG/DL (ref ?–130)
TRIGL SERPL-MCNC: 130 MG/DL (ref 30–149)
VIT D+METAB SERPL-MCNC: 20.5 NG/ML (ref 30–100)
VLDLC SERPL CALC-MCNC: 23 MG/DL (ref 0–30)

## 2024-02-15 PROCEDURE — 36415 COLL VENOUS BLD VENIPUNCTURE: CPT

## 2024-02-15 PROCEDURE — 80061 LIPID PANEL: CPT

## 2024-02-15 PROCEDURE — 82306 VITAMIN D 25 HYDROXY: CPT

## 2024-02-15 PROCEDURE — 99396 PREV VISIT EST AGE 40-64: CPT | Performed by: INTERNAL MEDICINE

## 2024-02-15 PROCEDURE — 99214 OFFICE O/P EST MOD 30 MIN: CPT | Performed by: INTERNAL MEDICINE

## 2024-02-15 RX ORDER — ROSUVASTATIN CALCIUM 5 MG/1
5 TABLET, COATED ORAL NIGHTLY
Qty: 90 TABLET | Refills: 0 | Status: SHIPPED | OUTPATIENT
Start: 2024-02-15

## 2024-02-15 RX ORDER — ESCITALOPRAM OXALATE 10 MG/1
10 TABLET ORAL DAILY
Qty: 90 TABLET | Refills: 0 | Status: SHIPPED | OUTPATIENT
Start: 2024-02-15

## 2024-02-15 NOTE — PATIENT INSTRUCTIONS
Prevention Guidelines, Women Ages 40 to 49  Screening tests and vaccines are an important part of managing your health. A screening test is done to find diseases in people who don't have any symptoms. The goal is to find a disease early so lifestyle changes and checkups can reduce the risk of disease. Or the goal may be to detect it early to treat it most effectively. Screening tests are not used to diagnose a disease. But they are used to see if more testing is needed. Health counseling is important, too. Below are guidelines for these, for women ages 40 to 49. Talk with your healthcare provider to make sure you’re up-to-date on what you need.  Screening Who needs it How often   Type 2 diabetes or prediabetes All women beginning at age 45 and women without symptoms at any age who are overweight or obese and have 1 or more additional risk factors for diabetes At least every 3 years1   Type 2 diabetes or prediabetes All women diagnosed with gestational diabetes Lifelong testing every 3 years   Type 2 diabetes All women with prediabetes Every year   Alcohol misuse All women in this age group At routine exams   Blood pressure All women in this age group Yearly checkup if your blood pressure is normal  Normal blood pressure is less than 120/80 mm Hg  If your blood pressure reading is higher than normal, follow the advice of your healthcare provider   Breast cancer All women at average risk in this age group Screening with a mammogram can start at age 40.2 Talk with your healthcare provider to help you decide when to start screening. At age 45 start yearly mammograms.3    Cervical cancer All women in this age group, except women who have had a complete hysterectomy Pap test every 3 years or Pap test plus human papilloma virus (HPV) test every 5 years   Colorectal cancer Women age 45 years and older at average risk  Multiple tests are available and are used at different times. Possible tests include:  Flexible  sigmoidoscopy every 5 years, or  Colonoscopy every 10 years, or  CT colonography (virtual colonoscopy) every 5 years, or  Yearly fecal occult blood test, or  Yearly fecal immunochemical test every year, or  Stool DNA test, every 3 years  If you choose a test other than a colonoscopy and have an abnormal test result, you will need to follow-up with a colonoscopy. Screening advice varies among expert groups. Talk with your healthcare provider about which tests are best for you.  Some people should be screened using a different schedule because of their personal or family health history. Talk with your healthcare provider about your health history.   Chlamydia Women at increased risk for infection At routine exams if you're at risk or have symptoms   Depression All women in this age group At routine exams   Gonorrhea Sexually active women at increased risk for infection At routine exams   Hepatitis C Anyone at increased risk; 1 time for those born between 1945 and 1965 At routine exams   High cholesterol or triglycerides All women ages 45 and older who are at risk for coronary artery disease; younger women, talk with your healthcare provider At least every 5 years   HIV All women At routine exams. Those with risk factors for HIV should be tested at least annually.   Obesity All women in this age group At routine exams   Syphilis Women at increased risk for infection-talk with your healthcare provider At routine exams   Tuberculosis Women at increased risk for infection-talk with your healthcare provider Ask your healthcare provider   Vision All women in this age group Complete exam at age 40 and eye exams every 2 to 4 years. If you have a chronic disease, ask your healthcare provider how often you should have your eyes examined.4   Vaccine Who needs it How often   Chickenpox (varicella) All women in this age group who have no record of this infection or vaccine 2 doses; the second dose should be given at least 4 weeks  after the first dose   Hepatitis A Women at increased risk for infection-talk with your healthcare provider 2 doses given 6 months apart   Hepatitis B Women at increased risk for infection-talk with your healthcare provider 3 doses over 6 months; second dose should be given 1 month after the first dose; the third dose should be given at least 2 months after the second dose and at least 4 months after the first dose   Haemophilus influenzae Type B (HIB) Women at increased risk 1 to 3 doses   Influenza (flu) All women in this age group Once a year   Measles, mumps, rubella (MMR) All women in this age group who have no record of these infections or vaccines 1 or 2 doses   Meningococcal Women at increased risk for infection-talk with your healthcare provider 1 or more doses   Pneumococcal conjugate vaccine (PCV13) and pneumococcal polysaccharide vaccine (PPSV23) Women at increased risk for infection-talk with your healthcare provider 1 or 2 doses   Tetanus/diphtheria/pertussis (Td/Tdap) booster All women in this age group A 1-time dose of Tdap instead of a Td booster after age 18, then Td every 10 years   Counseling Who needs it How often   BRCA gene mutation testing for breast and ovarian cancer susceptibility Women with increased risk for having gene mutation When your risk is known   Breast cancer and chemoprevention Women at high risk for breast cancer When your risk is known   Diet and exercise Women who are overweight or obese When diagnosed, and then at routine exams   Domestic violence Women at the age in which they are able to have children At routine exams   Sexually transmitted infection prevention Women at increased risk for infection-talk with your healthcare provider At routine exams   Use of tobacco and the health effects it can cause All women in this age group Every exam   1 American Diabetes Association  2 American College of Obstetricians and Gynecologists   3 American Cancer Society  4 American  Academy of Ophthalmology  Date Last Reviewed: 11/1/2017  © 8267-8181 [employer]. [address] [place] [address], [place] [address]. All rights reserved. This information is not intended as a substitute for professional medical care. Always follow your healthcare professional's instructions.

## 2024-02-15 NOTE — PROGRESS NOTES
Patient ID: Gianna Kaur is a 43 year old female.  Chief Complaint   Patient presents with    Physical        HISTORY OF PRESENT ILLNESS:   HPI  Patient presents for above.  Here for her complete physical and to discuss other issues.    History of hypercholesterolemia on low-dose rosuvastatin.  States she is compliant with the medication.  Does not appear to have had a recheck of her levels even though labs have been ordered for her.    History of vitamin D deficiency.  Has taken prescription vitamin D in the past.  Needs levels rechecked.    History of anxiety and depression.  This initially occurred due to a bad marriage.  She was on Lexapro which she self titrated off in the past.  Symptoms improved after her divorce.  She is again spearing seeing symptoms of anxiety but thinks this is due to stress.  She was recently fired from work after too many absences.  She saw another provider regarding this because she was also having palpitations.  She was started on sertraline and referred to cardiology as well as an event monitor.  She currently does not have a therapist but has seen 1 in the past.    While being evaluated for her palpitations she was found to be iron deficient.  Denies any black or red stools.  She is unaware if she is not taking enough iron.  She was prescribed iron by the other physician.    Patient has a family history of fatty liver disease.  Patient went for an ultrasound last year which did show mild fatty liver changes.  Her liver enzymes are normal.    History of bilateral breast cyst.  She is supposed to have a diagnostic mammogram and ultrasound done in April 2024.  Needs an order for this.    She is up-to-date on her Pap smear.  Does not want her influenza vaccination.    Review of Systems   Constitutional: Negative.    HENT: Negative.     Eyes: Negative.    Respiratory: Negative.     Cardiovascular: Negative.    Gastrointestinal: Negative.    Endocrine: Negative.     Genitourinary: Negative.    Musculoskeletal: Negative.    Skin: Negative.    Allergic/Immunologic: Negative.    Neurological: Negative.    Hematological: Negative.    Psychiatric/Behavioral:  Positive for dysphoric mood. The patient is nervous/anxious.      MEDICAL HISTORY:     Past Medical History:   Diagnosis Date    Depression 2010    tx for a few months    Vertigo        History reviewed. No pertinent surgical history.      Current Outpatient Medications:     escitalopram 10 MG Oral Tab, Take 1 tablet (10 mg total) by mouth daily., Disp: 90 tablet, Rfl: 0    rosuvastatin 5 MG Oral Tab, Take 1 tablet (5 mg total) by mouth nightly., Disp: 90 tablet, Rfl: 0    Iron, Ferrous Sulfate, 325 (65 Fe) MG Oral Tab, Take 325 mg by mouth in the morning and 325 mg before bedtime., Disp: 180 tablet, Rfl: 0    Allergies:No Known Allergies    Social History     Socioeconomic History    Marital status: Single     Spouse name: Not on file    Number of children: Not on file    Years of education: Not on file    Highest education level: Not on file   Occupational History    Not on file   Tobacco Use    Smoking status: Never    Smokeless tobacco: Never   Vaping Use    Vaping Use: Never used   Substance and Sexual Activity    Alcohol use: Not Currently     Comment: Socially     Drug use: Never    Sexual activity: Yes     Partners: Male     Birth control/protection: Condom   Other Topics Concern    Not on file   Social History Narrative    Not on file     Social Determinants of Health     Financial Resource Strain: Not on file   Food Insecurity: Not on file   Transportation Needs: Not on file   Physical Activity: Not on file   Stress: Not on file   Social Connections: Not on file   Housing Stability: Not on file           PHYSICAL EXAM:     Vitals:    02/15/24 0948   BP: 108/70   Pulse: 97   Resp: 20   Temp: 98.7 °F (37.1 °C)   TempSrc: Temporal   SpO2: 99%   Weight: 147 lb (66.7 kg)   Height: 5' 4\" (1.626 m)       Body mass index is  25.23 kg/m².    Physical Exam  Constitutional:       Appearance: Normal appearance.   HENT:      Head: Normocephalic and atraumatic.      Right Ear: External ear normal.      Left Ear: External ear normal.      Nose: Nose normal.      Mouth/Throat:      Mouth: Mucous membranes are moist.      Pharynx: Oropharynx is clear.   Eyes:      General: No scleral icterus.        Right eye: No discharge.         Left eye: No discharge.      Extraocular Movements: Extraocular movements intact.      Conjunctiva/sclera: Conjunctivae normal.      Pupils: Pupils are equal, round, and reactive to light.   Cardiovascular:      Rate and Rhythm: Normal rate and regular rhythm.      Pulses: Normal pulses.      Heart sounds: Normal heart sounds. No murmur heard.     No friction rub. No gallop.   Pulmonary:      Effort: Pulmonary effort is normal. No respiratory distress.      Breath sounds: No stridor. No wheezing or rales.   Abdominal:      General: Abdomen is flat. Bowel sounds are normal. There is no distension.      Palpations: There is no mass.      Tenderness: There is no abdominal tenderness. There is no guarding.   Genitourinary:     Comments: Exam deferred to patient's gynecologist.  Musculoskeletal:         General: No swelling. Normal range of motion.      Cervical back: Normal range of motion.      Right lower leg: No edema.      Left lower leg: No edema.   Skin:     General: Skin is warm.   Neurological:      General: No focal deficit present.      Mental Status: She is alert.   Psychiatric:         Mood and Affect: Mood normal.         Behavior: Behavior normal.       Lab Results   Component Value Date    WBC 5.0 02/05/2024    RBC 4.20 02/05/2024    HGB 9.2 (L) 02/05/2024    HCT 30.1 (L) 02/05/2024    MCV 71.7 (L) 02/05/2024    MCH 21.9 (L) 02/05/2024    MCHC 30.6 (L) 02/05/2024    RDW 16.3 (H) 02/05/2024    .0 02/05/2024    MPV 10.0 01/28/2018     Lab Results   Component Value Date     (H) 02/05/2024    BUN  9 02/05/2024    BUNCREA 12.5 02/05/2024    CREATSERUM 0.72 02/05/2024    ANIONGAP 7 02/05/2024    GFRNAA 108 03/13/2022    GFRAA 125 03/13/2022    CA 9.2 02/05/2024    OSMOCALC 287 02/05/2024    ALKPHO 67 02/05/2024    AST 14 02/05/2024    ALT 13 02/05/2024    BILT 0.4 02/05/2024    TP 7.0 02/05/2024    ALB 4.1 02/05/2024    GLOBULIN 2.9 02/05/2024     02/05/2024    K 3.9 02/05/2024     02/05/2024    CO2 25.0 02/05/2024     Lab Results   Component Value Date    CHOLEST 194 02/15/2024    TRIG 130 02/15/2024    HDL 47 02/15/2024     (H) 02/15/2024    VLDL 23 02/15/2024    NONHDLC 147 (H) 02/15/2024     Lab Results   Component Value Date    T4F 0.8 01/09/2020    TSH 1.882 02/05/2024     No results found for: \"EAG\", \"A1C\"  Lab Results   Component Value Date    COLORUR Yellow 11/22/2017    CLARITY Clear 11/22/2017    SPECGRAVITY 1.025 01/25/2018    GLUUR Negative 11/22/2017    BILUR Negative 11/22/2017    KETUR Negative 11/22/2017    BLOODURINE Negative 11/22/2017    PHURINE 6.0 01/25/2018    PROUR Negative 11/22/2017    UROBILINOGEN <2.0 11/22/2017    NITRITE neg 01/25/2018    LEUUR Negative 11/22/2017    NMIC Microscopic not indicated 11/22/2017           ASSESSMENT/PLAN:   1. Annual physical exam  Labs as above.    2. Hypercholesterolemia  Lipid Panel; Future  rosuvastatin 5 MG Oral Tab; Take 1 tablet (5 mg total) by mouth nightly.  Dispense: 90 tablet; Refill: 0    3. Vitamin D deficiency  Vitamin D; Future    4. Anxiety and depression  escitalopram 10 MG Oral Tab; Take 1 tablet (10 mg total) by mouth daily.  Dispense: 90 tablet; Refill: 0  Stop Zoloft.  Patient to resume seeing her therapist.    5. Iron deficiency anemia secondary to inadequate dietary iron intake  Patient taking iron as prescribed.  Will recheck labs again in 6 weeks.    6. Fatty liver  There is no transaminitis.  Avoid hepatotoxic agents.    7. Bilateral breast cysts  CHERELLE DIAGNOSTIC BILATERAL (CPT=77066); Future  US BREAST  BILATERAL COMPLETE (CPT=76641-50); Future    8. Immunization not carried out because of patient refusal  Influenza Vaccine Refused (Order that documents the process)    Return in about 6 weeks (around 3/28/2024) for Routine Follow-Up.    This note was prepared using Dragon Medical voice recognition dictation software. As a result errors may occur. When identified these errors have been corrected. While every attempt is made to correct errors during dictation discrepancies may still exist.    Gage Curry MD  2/15/2024

## 2024-04-09 ENCOUNTER — LAB ENCOUNTER (OUTPATIENT)
Dept: LAB | Age: 44
End: 2024-04-09
Attending: INTERNAL MEDICINE
Payer: MEDICAID

## 2024-04-09 ENCOUNTER — OFFICE VISIT (OUTPATIENT)
Dept: INTERNAL MEDICINE CLINIC | Facility: CLINIC | Age: 44
End: 2024-04-09

## 2024-04-09 VITALS
SYSTOLIC BLOOD PRESSURE: 118 MMHG | HEART RATE: 64 BPM | RESPIRATION RATE: 18 BRPM | BODY MASS INDEX: 25.27 KG/M2 | HEIGHT: 64 IN | OXYGEN SATURATION: 99 % | DIASTOLIC BLOOD PRESSURE: 64 MMHG | WEIGHT: 148 LBS

## 2024-04-09 DIAGNOSIS — N64.4 BREAST PAIN: ICD-10-CM

## 2024-04-09 DIAGNOSIS — D50.8 IRON DEFICIENCY ANEMIA SECONDARY TO INADEQUATE DIETARY IRON INTAKE: Primary | ICD-10-CM

## 2024-04-09 DIAGNOSIS — N60.02 BILATERAL BREAST CYSTS: ICD-10-CM

## 2024-04-09 DIAGNOSIS — N60.01 BILATERAL BREAST CYSTS: ICD-10-CM

## 2024-04-09 PROCEDURE — 99214 OFFICE O/P EST MOD 30 MIN: CPT | Performed by: INTERNAL MEDICINE

## 2024-04-09 RX ORDER — PNV NO.95/FERROUS FUM/FOLIC AC 28MG-0.8MG
TABLET ORAL
Status: ON HOLD | COMMUNITY
Start: 2024-02-08

## 2024-04-09 NOTE — PROGRESS NOTES
Patient ID: Gianna Kaur is a 43 year old female.  Chief Complaint   Patient presents with    Follow - Up        HISTORY OF PRESENT ILLNESS:   HPI  Patient presents for above.  Here for 6-month follow-up of multiple issues.    Found to be iron deficient late in 2023.  Has been taking her iron supplements.  Source is unclear at this point.  She is taking her medication as prescribed for the most part.  Needs levels rechecked.    Complaining of bilateral breast pain, right greater than left.  This is an every day occurrence and not associated with her menses.  Pain in the right breast associated with her axilla and right upper outer area.  Denies any nipple changes or discharge.  No asymmetry noted.  She does have a diagnostic mammogram and ultrasound scheduled for May 2024  History of bilateral breast cysts.    States she may go for a tummy tuck in the summer 2024.    Review of Systems   Constitutional: Negative.    HENT: Negative.     Eyes: Negative.    Respiratory: Negative.     Cardiovascular: Negative.    Endocrine: Negative.    Genitourinary: Negative.    Musculoskeletal: Negative.    Allergic/Immunologic: Negative.    Neurological: Negative.    Hematological: Negative.      MEDICAL HISTORY:     Past Medical History:   Diagnosis Date    Depression 2010    tx for a few months    Vertigo        History reviewed. No pertinent surgical history.      Current Outpatient Medications:     Ferrous Sulfate (IRON) 325 (65 Fe) MG Oral Tab, iron 325 mg (65 mg iron) tablet, [RxNorm: 332384], Disp: , Rfl:     escitalopram 10 MG Oral Tab, Take 1 tablet (10 mg total) by mouth daily., Disp: 90 tablet, Rfl: 0    rosuvastatin 5 MG Oral Tab, Take 1 tablet (5 mg total) by mouth nightly., Disp: 90 tablet, Rfl: 0    Allergies:No Known Allergies    Social History     Socioeconomic History    Marital status: Single     Spouse name: Not on file    Number of children: Not on file    Years of education: Not on file    Highest  education level: Not on file   Occupational History    Not on file   Tobacco Use    Smoking status: Never    Smokeless tobacco: Never   Vaping Use    Vaping Use: Never used   Substance and Sexual Activity    Alcohol use: Not Currently     Comment: Socially     Drug use: Never    Sexual activity: Yes     Partners: Male     Birth control/protection: Condom   Other Topics Concern    Not on file   Social History Narrative    Not on file     Social Determinants of Health     Financial Resource Strain: Not on file   Food Insecurity: Not on file   Transportation Needs: Not on file   Physical Activity: Not on file   Stress: Not on file   Social Connections: Not on file   Housing Stability: Not on file           PHYSICAL EXAM:     Vitals:    04/09/24 0915   BP: 118/64   Pulse: 64   Resp: 18   SpO2: 99%   Weight: 148 lb (67.1 kg)   Height: 5' 4\" (1.626 m)       Body mass index is 25.4 kg/m².    Physical Exam  Constitutional:       Appearance: Normal appearance.   Eyes:      General: No scleral icterus.  Cardiovascular:      Rate and Rhythm: Normal rate and regular rhythm.      Pulses: Normal pulses.      Heart sounds: Normal heart sounds. No murmur heard.  Pulmonary:      Effort: Pulmonary effort is normal. No respiratory distress.      Breath sounds: Normal breath sounds.   Chest:          Comments: STIVEN Turner present for examination.  Abdominal:      General: Abdomen is flat. Bowel sounds are normal.      Palpations: Abdomen is soft.   Neurological:      Mental Status: She is alert.   Psychiatric:         Mood and Affect: Mood normal.         Behavior: Behavior normal.           ASSESSMENT/PLAN:   1. Iron deficiency anemia secondary to inadequate dietary iron intake  Iron panel ordered.    2. Bilateral breast cysts  Will accelerate diagnostic mammogram.    3. Breast pain  As per #2.    Return in about 6 months (around 10/9/2024) for Complete physical.    This note was prepared using Dragon Medical voice recognition  dictation software. As a result errors may occur. When identified these errors have been corrected. While every attempt is made to correct errors during dictation discrepancies may still exist.    Gage Curry MD  4/9/2024

## 2024-04-15 ENCOUNTER — APPOINTMENT (OUTPATIENT)
Dept: ULTRASOUND IMAGING | Facility: HOSPITAL | Age: 44
End: 2024-04-15
Attending: EMERGENCY MEDICINE

## 2024-04-15 ENCOUNTER — HOSPITAL ENCOUNTER (OUTPATIENT)
Facility: HOSPITAL | Age: 44
Setting detail: OBSERVATION
Discharge: HOME | End: 2024-04-18
Attending: EMERGENCY MEDICINE | Admitting: HOSPITALIST

## 2024-04-15 DIAGNOSIS — K81.0 ACUTE CHOLECYSTITIS: Primary | ICD-10-CM

## 2024-04-15 LAB
ALBUMIN SERPL-MCNC: 4.2 G/DL (ref 3.2–4.8)
ALBUMIN/GLOB SERPL: 1.4 {RATIO} (ref 1–2)
ALP LIVER SERPL-CCNC: 73 U/L
ALT SERPL-CCNC: 33 U/L
ANION GAP SERPL CALC-SCNC: 5 MMOL/L (ref 0–18)
AST SERPL-CCNC: 47 U/L (ref ?–34)
BASOPHILS # BLD AUTO: 0.05 X10(3) UL (ref 0–0.2)
BASOPHILS NFR BLD AUTO: 0.4 %
BILIRUB SERPL-MCNC: 0.4 MG/DL (ref 0.3–1.2)
BUN BLD-MCNC: 10 MG/DL (ref 9–23)
BUN/CREAT SERPL: 13.3 (ref 10–20)
CALCIUM BLD-MCNC: 9.2 MG/DL (ref 8.7–10.4)
CHLORIDE SERPL-SCNC: 108 MMOL/L (ref 98–112)
CO2 SERPL-SCNC: 26 MMOL/L (ref 21–32)
CREAT BLD-MCNC: 0.75 MG/DL
DEPRECATED RDW RBC AUTO: 59.8 FL (ref 35.1–46.3)
EGFRCR SERPLBLD CKD-EPI 2021: 101 ML/MIN/1.73M2 (ref 60–?)
EOSINOPHIL # BLD AUTO: 0.01 X10(3) UL (ref 0–0.7)
EOSINOPHIL NFR BLD AUTO: 0.1 %
ERYTHROCYTE [DISTWIDTH] IN BLOOD BY AUTOMATED COUNT: 21.2 % (ref 11–15)
GLOBULIN PLAS-MCNC: 3 G/DL (ref 2.8–4.4)
GLUCOSE BLD-MCNC: 120 MG/DL (ref 70–99)
HCT VFR BLD AUTO: 36.5 %
HGB BLD-MCNC: 12 G/DL
IMM GRANULOCYTES # BLD AUTO: 0.07 X10(3) UL (ref 0–1)
IMM GRANULOCYTES NFR BLD: 0.6 %
LIPASE SERPL-CCNC: 36 U/L (ref 13–75)
LYMPHOCYTES # BLD AUTO: 1.22 X10(3) UL (ref 1–4)
LYMPHOCYTES NFR BLD AUTO: 9.8 %
MCH RBC QN AUTO: 26.1 PG (ref 26–34)
MCHC RBC AUTO-ENTMCNC: 32.9 G/DL (ref 31–37)
MCV RBC AUTO: 79.5 FL
MONOCYTES # BLD AUTO: 0.74 X10(3) UL (ref 0.1–1)
MONOCYTES NFR BLD AUTO: 5.9 %
NEUTROPHILS # BLD AUTO: 10.35 X10 (3) UL (ref 1.5–7.7)
NEUTROPHILS # BLD AUTO: 10.35 X10(3) UL (ref 1.5–7.7)
NEUTROPHILS NFR BLD AUTO: 83.2 %
OSMOLALITY SERPL CALC.SUM OF ELEC: 288 MOSM/KG (ref 275–295)
PLATELET # BLD AUTO: 295 10(3)UL (ref 150–450)
POTASSIUM SERPL-SCNC: 3.6 MMOL/L (ref 3.5–5.1)
PROT SERPL-MCNC: 7.2 G/DL (ref 5.7–8.2)
RBC # BLD AUTO: 4.59 X10(6)UL
SODIUM SERPL-SCNC: 139 MMOL/L (ref 136–145)
WBC # BLD AUTO: 12.4 X10(3) UL (ref 4–11)

## 2024-04-15 PROCEDURE — 76705 ECHO EXAM OF ABDOMEN: CPT | Performed by: EMERGENCY MEDICINE

## 2024-04-15 PROCEDURE — 99222 1ST HOSP IP/OBS MODERATE 55: CPT | Performed by: HOSPITALIST

## 2024-04-15 RX ORDER — METRONIDAZOLE 500 MG/100ML
500 INJECTION, SOLUTION INTRAVENOUS ONCE
Status: COMPLETED | OUTPATIENT
Start: 2024-04-15 | End: 2024-04-15

## 2024-04-15 RX ORDER — METRONIDAZOLE 500 MG/100ML
500 INJECTION, SOLUTION INTRAVENOUS EVERY 8 HOURS
Status: DISCONTINUED | OUTPATIENT
Start: 2024-04-16 | End: 2024-04-18

## 2024-04-15 RX ORDER — MORPHINE SULFATE 2 MG/ML
2 INJECTION, SOLUTION INTRAMUSCULAR; INTRAVENOUS EVERY 2 HOUR PRN
Status: DISCONTINUED | OUTPATIENT
Start: 2024-04-15 | End: 2024-04-18

## 2024-04-15 RX ORDER — SODIUM CHLORIDE 9 MG/ML
INJECTION, SOLUTION INTRAVENOUS CONTINUOUS
Status: DISCONTINUED | OUTPATIENT
Start: 2024-04-15 | End: 2024-04-18

## 2024-04-15 RX ORDER — SODIUM CHLORIDE 9 MG/ML
INJECTION, SOLUTION INTRAVENOUS CONTINUOUS
Status: ACTIVE | OUTPATIENT
Start: 2024-04-15 | End: 2024-04-15

## 2024-04-15 RX ORDER — ACETAMINOPHEN 500 MG
500 TABLET ORAL EVERY 4 HOURS PRN
Status: DISCONTINUED | OUTPATIENT
Start: 2024-04-15 | End: 2024-04-18

## 2024-04-15 RX ORDER — ONDANSETRON 2 MG/ML
4 INJECTION INTRAMUSCULAR; INTRAVENOUS EVERY 6 HOURS PRN
Status: DISCONTINUED | OUTPATIENT
Start: 2024-04-15 | End: 2024-04-18

## 2024-04-15 RX ORDER — PROCHLORPERAZINE EDISYLATE 5 MG/ML
5 INJECTION INTRAMUSCULAR; INTRAVENOUS EVERY 8 HOURS PRN
Status: DISCONTINUED | OUTPATIENT
Start: 2024-04-15 | End: 2024-04-18

## 2024-04-15 RX ORDER — TEMAZEPAM 15 MG/1
15 CAPSULE ORAL NIGHTLY PRN
Status: DISCONTINUED | OUTPATIENT
Start: 2024-04-15 | End: 2024-04-18

## 2024-04-15 RX ORDER — MORPHINE SULFATE 4 MG/ML
4 INJECTION, SOLUTION INTRAMUSCULAR; INTRAVENOUS EVERY 2 HOUR PRN
Status: DISCONTINUED | OUTPATIENT
Start: 2024-04-15 | End: 2024-04-18

## 2024-04-15 RX ORDER — MORPHINE SULFATE 4 MG/ML
4 INJECTION, SOLUTION INTRAMUSCULAR; INTRAVENOUS ONCE
Status: COMPLETED | OUTPATIENT
Start: 2024-04-15 | End: 2024-04-15

## 2024-04-15 RX ORDER — MORPHINE SULFATE 2 MG/ML
1 INJECTION, SOLUTION INTRAMUSCULAR; INTRAVENOUS EVERY 2 HOUR PRN
Status: DISCONTINUED | OUTPATIENT
Start: 2024-04-15 | End: 2024-04-18

## 2024-04-15 NOTE — ED QUICK NOTES
Patient to ed via private vehicle patient co of epigastric pain x this am after eating. +nausea +vomiting

## 2024-04-15 NOTE — ED INITIAL ASSESSMENT (HPI)
Pt to ED with  with c/o mid upper abdominal pain that radiates to mid back after eating a burrito this am. Pt states intermittent nausea without vomiting. Pt denies fall or trauma. Pt denies fever. Pt states normal BM today. Pt states on iron supplements. No respiratory distress noted. Pt is alert and oriented x4. Pt skin parameters WNL.

## 2024-04-16 LAB
ANION GAP SERPL CALC-SCNC: 6 MMOL/L (ref 0–18)
B-HCG UR QL: NEGATIVE
BASOPHILS # BLD AUTO: 0.05 X10(3) UL (ref 0–0.2)
BASOPHILS NFR BLD AUTO: 0.6 %
BUN BLD-MCNC: 9 MG/DL (ref 9–23)
BUN/CREAT SERPL: 14.5 (ref 10–20)
CALCIUM BLD-MCNC: 8.5 MG/DL (ref 8.7–10.4)
CHLORIDE SERPL-SCNC: 112 MMOL/L (ref 98–112)
CO2 SERPL-SCNC: 25 MMOL/L (ref 21–32)
CREAT BLD-MCNC: 0.62 MG/DL
DEPRECATED RDW RBC AUTO: 63.3 FL (ref 35.1–46.3)
EGFRCR SERPLBLD CKD-EPI 2021: 113 ML/MIN/1.73M2 (ref 60–?)
EOSINOPHIL # BLD AUTO: 0.11 X10(3) UL (ref 0–0.7)
EOSINOPHIL NFR BLD AUTO: 1.4 %
ERYTHROCYTE [DISTWIDTH] IN BLOOD BY AUTOMATED COUNT: 21.3 % (ref 11–15)
GLUCOSE BLD-MCNC: 97 MG/DL (ref 70–99)
HCT VFR BLD AUTO: 36.2 %
HGB BLD-MCNC: 11.2 G/DL
IMM GRANULOCYTES # BLD AUTO: 0.03 X10(3) UL (ref 0–1)
IMM GRANULOCYTES NFR BLD: 0.4 %
LYMPHOCYTES # BLD AUTO: 2.66 X10(3) UL (ref 1–4)
LYMPHOCYTES NFR BLD AUTO: 33.9 %
MCH RBC QN AUTO: 25.9 PG (ref 26–34)
MCHC RBC AUTO-ENTMCNC: 30.9 G/DL (ref 31–37)
MCV RBC AUTO: 83.6 FL
MONOCYTES # BLD AUTO: 0.6 X10(3) UL (ref 0.1–1)
MONOCYTES NFR BLD AUTO: 7.7 %
NEUTROPHILS # BLD AUTO: 4.39 X10 (3) UL (ref 1.5–7.7)
NEUTROPHILS # BLD AUTO: 4.39 X10(3) UL (ref 1.5–7.7)
NEUTROPHILS NFR BLD AUTO: 56 %
OSMOLALITY SERPL CALC.SUM OF ELEC: 295 MOSM/KG (ref 275–295)
PLATELET # BLD AUTO: 267 10(3)UL (ref 150–450)
POTASSIUM SERPL-SCNC: 4.4 MMOL/L (ref 3.5–5.1)
RBC # BLD AUTO: 4.33 X10(6)UL
SODIUM SERPL-SCNC: 143 MMOL/L (ref 136–145)
WBC # BLD AUTO: 7.8 X10(3) UL (ref 4–11)

## 2024-04-16 PROCEDURE — 99233 SBSQ HOSP IP/OBS HIGH 50: CPT | Performed by: HOSPITALIST

## 2024-04-16 PROCEDURE — 99204 OFFICE O/P NEW MOD 45 MIN: CPT | Performed by: SURGERY

## 2024-04-16 NOTE — H&P (VIEW-ONLY)
[facility]    PATIENT'S NAME: SAGRARIO MARTIN   ATTENDING PHYSICIAN: Filomena León MD   CONSULTING PHYSICIAN: Tj Staples MD   PATIENT ACCOUNT#:   [id number]    LOCATION:  [address] [place] [address]  MEDICAL RECORD #:   [id number]       YOB: 1980  ADMISSION DATE:       04/15/2024      CONSULT DATE:  04/15/2024    REPORT OF CONSULTATION      REASON FOR CONSULTATION:  Acute cholecystitis.    HISTORY OF PRESENT ILLNESS:  The patient is 43, presents with recurring right upper abdominal pain, nausea, vomiting.  Ultrasound suggests acute cholecystitis.  White count 12.4.  Started on IV antibiotics.    PAST MEDICAL HISTORY:  None.    PAST SURGICAL HISTORY:  None.      MEDICATIONS:  Please see reconciliation.    ALLERGIES:  None.    SOCIAL HISTORY:  She does not smoke, drink, or take illicit drugs.    REVIEW OF SYSTEMS:  Significant for recurring upper abdominal pain, nausea, vomiting, radiating to the back.      PHYSICAL EXAMINATION:    GENERAL:  She is alert and oriented x3, appears uncomfortable.  VITAL SIGNS:  Stable.  HEENT:  Atraumatic.  Nonicteric sclera.  EOMI, PERRLA.  NECK:  Supple without lymphadenopathy.  Trachea midline.  Full range of motion.  LUNGS:  Clear to auscultation.  No rhonchi or wheezing.  HEART:  Regular rate and rhythm.  No murmur, rubs, or gallops.  ABDOMEN:  Soft.  Tender in right upper quadrant.  Positive Daley sign.  EXTREMITIES:  Without clubbing, cyanosis, or edema.    LABORATORY DATA:  White count of 12.4.  LFTs normal.      IMPRESSION:  Acute cholecystitis.  N.p.o., IV antibiotics.    PLAN:  Laparoscopic cholecystectomy when OR allows.    Dictated By Tj Staples MD  d: 04/16/2024 07:10:45  t: 04/16/2024 07:31:38  Job [id number]  GL/

## 2024-04-16 NOTE — ED QUICK NOTES
Orders for admission, patient is aware of plan and ready to go upstairs. Any questions, please call ED RN kavin  at extension [phone].   Chief Complaint   Patient presents with    Abdomen/Flank Pain       Patient AO x 3  Ambulation: ambulatory  Belongings: accompanying patient   Medications: see mar  IV: 20 rac  Language: english  COVID-19 suspicion level/status: na  CIWA SCORE: na  NIH: na  Other pertinent information:  npo after midnight

## 2024-04-16 NOTE — H&P
[facility]    PATIENT'S NAME: SAGRARIO MARTIN   ATTENDING PHYSICIAN: Beni Carney MD   PATIENT ACCOUNT#:   [id number]    LOCATION:  [place] [address]  MEDICAL RECORD #:   [id number]       YOB: 1980  ADMISSION DATE:       04/15/2024    HISTORY AND PHYSICAL EXAMINATION    CHIEF COMPLAINT:  Acute cholecystitis.     HISTORY OF PRESENT ILLNESS:  Patient is a 43-year-old  female who came into the emergency department for evaluation of recurrent right upper quadrant pain associated with nausea since this morning after she ate a high-fat meal.  CBC showed white blood cell count of 12.4 with left shift.  Chemistry and liver function tests were unremarkable.  Gallbladder ultrasound showed acute cholecystitis with gallstones, common bile duct 8 mm.  Started on IV antibiotics, and she will be admitted to the hospital for further management.     PAST MEDICAL HISTORY:  None.    PAST SURGICAL HISTORY:  None.    MEDICATIONS:  Please see medication reconciliation list.     ALLERGIES:  No known drug allergies.     FAMILY HISTORY:  Multiple female family members with cholecystectomies.  Father had prostate cancer.      SOCIAL HISTORY:  No tobacco, alcohol, or drug use.      REVIEW OF SYSTEMS:  Right upper quadrant pain radiating to the back associated with nausea and sporadic vomiting since this morning.  Other 12-point review of systems is negative.       PHYSICAL EXAMINATION:    GENERAL:  Alert and oriented to time, place and person.  Moderate distress.   VITAL SIGNS:  Temperature 98.4, pulse 65, respiratory rate 22, blood pressure 120/74, pulse ox 100% on room air.  HEENT:  Atraumatic.  Oropharynx clear.  Moist mucous membranes.  Normal hard and soft palate.  Eyes:  Anicteric sclerae.    NECK:  Supple.  No lymphadenopathy.  Trachea midline.  Full range of motion.   LUNGS:  Clear to auscultation bilaterally.  Normal respiratory effort.    HEART:  Regular rate and rhythm.  S1 and S2  auscultated.  No murmur.    ABDOMEN:  Soft, nondistended.  Tenderness, right upper quadrant area noted on exam.  Positive bowel sounds.   EXTREMITIES:  No peripheral edema, clubbing or cyanosis.   NEUROLOGIC:  Motor and sensory intact.      ASSESSMENT:  Acute cholecystitis.      PLAN:  Patient will be admitted to general medical floor.  IV antibiotics.  Pain and nausea control.  General surgery consult for laparoscopic cholecystectomy.  Further recommendations to follow.     Dictated By Filomena León MD  d: 04/15/2024 18:50:08  t: 04/15/2024 19:33:27  Job [id number]  /

## 2024-04-16 NOTE — ED PROVIDER NOTES
Patient Seen in: [facility] Emergency Department    History     Chief Complaint   Patient presents with    Abdomen/Flank Pain       HPI    The patient presents to the ED complaining of epigastric/right upper quadrant pain that radiates towards her back after eating a burrito this morning.  She states the pain was intense for several hours following and eventually calm down however pain returned after eating a small amount of food for lunch.  She now presents to the ED and states that pain is now improved but slightly still present.  Pain was severe at maximum, mild currently.  Denies other complaints or history of similar in the past.    History reviewed.   Past Medical History:    Depression    tx for a few months    Vertigo       History reviewed. History reviewed. No pertinent surgical history.      Medications :  Medications Prior to Admission   Medication Sig Dispense Refill Last Dose    Ferrous Sulfate (IRON) 325 (65 Fe) MG Oral Tab iron 325 mg (65 mg iron) tablet, [RxNorm: 470264]   4/14/2024 at [facility]    rosuvastatin 5 MG Oral Tab Take 1 tablet (5 mg total) by mouth nightly. 90 tablet 0 Past Week    escitalopram 10 MG Oral Tab Take 1 tablet (10 mg total) by mouth daily. 90 tablet 0 More than a month        Family History   Problem Relation Age of Onset    Cancer Father         prostate    Other (Other) Mother         Cirosis, Hep B    Diabetes Other         maternal side       Smoking Status:   Social History     Socioeconomic History    Marital status: Single   Tobacco Use    Smoking status: Never    Smokeless tobacco: Never   Vaping Use    Vaping status: Never Used   Substance and Sexual Activity    Alcohol use: Not Currently     Comment: Socially     Drug use: Never    Sexual activity: Yes     Partners: Male     Birth control/protection: Condom       Constitutional and vital signs reviewed.      Social History and Family History elements reviewed from today, pertinent positives to the presenting  problem noted.    Physical Exam     ED Triage Vitals [04/15/24 1436]   /74   Pulse 65   Resp 22   Temp 98.4 °F (36.9 °C)   Temp src Oral   SpO2 100 %   O2 Device None (Room air)       All measures to prevent infection transmission during my interaction with the patient were taken. Handwashing was performed prior to and after the exam.  Stethoscope and any equipment used during my examination was cleaned with super sani-cloth germicidal wipes following the exam.     Physical Exam  Vitals and nursing note reviewed.   Constitutional:       General: She is not in acute distress.     Appearance: She is well-developed.   HENT:      Head: Normocephalic and atraumatic.   Eyes:      General:         Right eye: No discharge.         Left eye: No discharge.      Conjunctiva/sclera: Conjunctivae normal.   Neck:      Trachea: No tracheal deviation.   Cardiovascular:      Rate and Rhythm: Normal rate.   Pulmonary:      Effort: Pulmonary effort is normal. No respiratory distress.      Breath sounds: No stridor.   Abdominal:      General: There is no distension.      Palpations: Abdomen is soft.      Tenderness: There is abdominal tenderness in the right upper quadrant and epigastric area. There is no guarding or rebound.   Musculoskeletal:         General: No deformity.   Skin:     General: Skin is warm and dry.   Neurological:      Mental Status: She is alert and oriented to person, place, and time.   Psychiatric:         Mood and Affect: Mood normal.         Behavior: Behavior normal.         ED Course        Labs Reviewed   COMP METABOLIC PANEL (14) - Abnormal; Notable for the following components:       Result Value    Glucose 120 (*)     AST 47 (*)     All other components within normal limits   CBC W/ DIFFERENTIAL - Abnormal; Notable for the following components:    WBC 12.4 (*)     MCV 79.5 (*)     RDW-SD 59.8 (*)     RDW 21.2 (*)     Neutrophil Absolute Prelim 10.35 (*)     Neutrophil Absolute 10.35 (*)     All other  components within normal limits   LIPASE - Normal   CBC WITH DIFFERENTIAL WITH PLATELET    Narrative:     The following orders were created for panel order CBC With Differential With Platelet.                  Procedure                               Abnormality         Status                                     ---------                               -----------         ------                                     CBC W/ DIFFERENTIAL[[id number]]          Abnormal            Final result                                                 Please view results for these tests on the individual orders.       As Interpreted by me    Imaging Results Available and Reviewed while in ED: US GALLBLADDER (CPT=76705)    Result Date: 4/15/2024  CONCLUSION:  Imaging findings concerning for acute cholecystitis, including gallbladder distension, gallstones, and pericholecystic fluid.  There is however no gallbladder wall thickening or sonographic Daley sign.  Correlate with laboratory values to assess for acute cholecystitis.  Consider further assessment with HIDA scan, if confirmatory examination is required.  Dilation of the common bile duct measuring 8 mm.  Common bile duct stone is not demonstrated during this examination, but choledocholithiasis would be a concern in this setting.  Further assessment can be made with MRCPor ERCP.      Dictated by (CST): Estella Poon MD on 4/15/2024 at 6:22 PM     Finalized by (CST): Estella Poon MD on 4/15/2024 at 6:27 PM         ED Medications Administered:   Medications   metRONIDAZOLE in sodium chloride 0.79% (Flagyl) 5 mg/mL IVPB premix 500 mg (has no administration in time range)   sodium chloride 0.9% infusion (has no administration in time range)   sodium chloride 0.9% infusion (has no administration in time range)   acetaminophen (Tylenol Extra Strength) tab 500 mg (has no administration in time range)   ondansetron (Zofran) 4 MG/2ML injection 4 mg (has no administration in time  range)   prochlorperazine (Compazine) 10 MG/2ML injection 5 mg (has no administration in time range)   temazepam (Restoril) cap 15 mg (has no administration in time range)   morphINE PF 2 MG/ML injection 1 mg (has no administration in time range)     Or   morphINE PF 2 MG/ML injection 2 mg (has no administration in time range)     Or   morphINE PF 4 MG/ML injection 4 mg (has no administration in time range)   cefTRIAXone (Rocephin) 1 g in D5W 100 mL IVPB-ADD (has no administration in time range)   metRONIDAZOLE in sodium chloride 0.79% (Flagyl) 5 mg/mL IVPB premix 500 mg (has no administration in time range)   morphINE PF 4 MG/ML injection 4 mg (4 mg Intravenous Given 4/15/24 1847)   cefTRIAXone (Rocephin) 1 g in D5W 100 mL IVPB-ADD (1 g Intravenous New Bag 4/15/24 1901)         MDM     Vitals:    04/15/24 1436 04/15/24 1900 04/15/24 1956 04/15/24 2039   BP: 120/74 106/63 102/66 108/56   BP Location:    Left arm   Pulse: 65 69 70 79   Resp: 22  18 18   Temp: 98.4 °F (36.9 °C)   98.7 °F (37.1 °C)   TempSrc: Oral   Oral   SpO2: 100% 97% 98% 98%   Weight: 67.1 kg      Height: 162.6 cm (5' 4\")        *I personally reviewed and interpreted all ED vitals.    Pulse Ox: 98%, Room air, Normal     Monitor Interpretation:   normal sinus rhythm as interpreted by me.  The cardiac monitor was ordered to monitor heart rate.    Differential Diagnosis/ Diagnostic Considerations: GERD, gastritis, PUD, pancreatitis, biliary colic, choledocholithiasis, other    Complicating Factors: The patient already has does not have any pertinent problems on file. to contribute to the complexity of this ED evaluation.    Medical Decision Making  Patient presents to the ED with abdominal pain concerning for infection versus gallbladder disease versus pancreatitis.  Laboratory workup in the ED without concerning findings.  Normal LFTs and no leukocytosis.  Ultrasound concerning for acute cholecystitis.  Patient given pain meds and started on IV  antibiotics.  I discussed with Dr. Staples for surgical consultation and Dr. León for admission.    Problems Addressed:  Acute cholecystitis: acute illness or injury that poses a threat to life or bodily functions    Amount and/or Complexity of Data Reviewed  Labs: ordered. Decision-making details documented in ED Course.  Radiology: ordered and independent interpretation performed. Decision-making details documented in ED Course.     Details: I personally reviewed the patient's gallbladder ultrasound and noted gallstones  Discussion of management or test interpretation with external provider(s): I discussed with Dr. Staples for surgical consultation and Dr. León for admission.    Risk  Parenteral controlled substances.        Condition upon leaving the department: Stable    Disposition and Plan     Clinical Impression:  1. Acute cholecystitis        Disposition:  Admit    Follow-up:  No follow-up provider specified.    Medications Prescribed:  Current Discharge Medication List          Hospital Problems       Present on Admission  Date Reviewed: 4/9/2024            ICD-10-CM Noted POA    * (Principal) Acute cholecystitis K81.0 4/15/2024 Unknown

## 2024-04-16 NOTE — SPIRITUAL CARE NOTE
Spiritual Care Visit Note    Patient Name: Gianna Kaur Date of Spiritual Care Visit: 24   : 1980 Primary Dx: Acute cholecystitis       Referred By: Referral From: Patient    Spiritual Care Taxonomy:    Intended Effects: Aligning care plan with patient's values    Methods: Offer support    Interventions: Active listening;Ask guided questions    Visit Type/Summary:   met with patient per a referral for communion.  Patient received communion from .  Writer checked in and offered support for upcoming surgery and informed patient  remains available for follow up.    Chaplain Arango [phone]

## 2024-04-16 NOTE — CONSULTS
[facility]    PATIENT'S NAME: SAGRARIO MARTIN   ATTENDING PHYSICIAN: Filomena León MD   CONSULTING PHYSICIAN: Tj Stalpes MD   PATIENT ACCOUNT#:   [id number]    LOCATION:  [address] [place] [address]  MEDICAL RECORD #:   [id number]       YOB: 1980  ADMISSION DATE:       04/15/2024      CONSULT DATE:  04/15/2024    REPORT OF CONSULTATION      REASON FOR CONSULTATION:  Acute cholecystitis.    HISTORY OF PRESENT ILLNESS:  The patient is 43, presents with recurring right upper abdominal pain, nausea, vomiting.  Ultrasound suggests acute cholecystitis.  White count 12.4.  Started on IV antibiotics.    PAST MEDICAL HISTORY:  None.    PAST SURGICAL HISTORY:  None.      MEDICATIONS:  Please see reconciliation.    ALLERGIES:  None.    SOCIAL HISTORY:  She does not smoke, drink, or take illicit drugs.    REVIEW OF SYSTEMS:  Significant for recurring upper abdominal pain, nausea, vomiting, radiating to the back.      PHYSICAL EXAMINATION:    GENERAL:  She is alert and oriented x3, appears uncomfortable.  VITAL SIGNS:  Stable.  HEENT:  Atraumatic.  Nonicteric sclera.  EOMI, PERRLA.  NECK:  Supple without lymphadenopathy.  Trachea midline.  Full range of motion.  LUNGS:  Clear to auscultation.  No rhonchi or wheezing.  HEART:  Regular rate and rhythm.  No murmur, rubs, or gallops.  ABDOMEN:  Soft.  Tender in right upper quadrant.  Positive Daley sign.  EXTREMITIES:  Without clubbing, cyanosis, or edema.    LABORATORY DATA:  White count of 12.4.  LFTs normal.      IMPRESSION:  Acute cholecystitis.  N.p.o., IV antibiotics.    PLAN:  Laparoscopic cholecystectomy when OR allows.    Dictated By Tj Staples MD  d: 04/16/2024 07:10:45  t: 04/16/2024 07:31:38  Job [id number]  GL/

## 2024-04-16 NOTE — PROGRESS NOTES
[facility]  part of [facility]    Progress Note    Gianna Kaur Patient Status:  Observation    1980 MRN [id number]   Location [facility] 4W/SW/SE Attending Heydi Powers MD   Hosp Day # 0 PCP Gage Curry MD     Chief Complaint: acute jordy    SUBJECTIVE:    No acute events overnight.  Patient denies chest pain, sob.  No fevers/chills.  No n/v/abd pain.  Awaiting surgery    10 ROS completed and otherwise negative.    OBJECTIVE:  Vital signs in last 24 hours:  /69 (BP Location: Left arm)   Pulse 67   Temp 98.2 °F (36.8 °C) (Temporal)   Resp 18   Ht 5' 4\" (1.626 m)   Wt 148 lb (67.1 kg)   LMP  (Approximate)   SpO2 100%   BMI 25.40 kg/m²     Intake/Output:  No intake or output data in the 24 hours ending 24 1305    Wt Readings from Last 3 Encounters:   04/15/24 148 lb (67.1 kg)   24 148 lb (67.1 kg)   02/15/24 147 lb (66.7 kg)       Exam     Gen: No acute distress  Pulm: Lungs clear, normal respiratory effort  CV: Heart with regular rate and rhythm  Abd: Abdomen soft  Neuro: No acute focal deficits  MSK: Full range of motion in extremities  Skin: Warm and dry  Psych: Normal affect  Ext: no c/c/e    Data Review:     Labs:   Lab Results   Component Value Date    WBC 7.8 2024    HGB 11.2 2024    HCT 36.2 2024    .0 2024    CREATSERUM 0.62 2024    BUN 9 2024     2024    K 4.4 2024     2024    CO2 25.0 2024    GLU 97 2024    CA 8.5 2024    ALB 4.2 04/15/2024    ALKPHO 73 04/15/2024    BILT 0.4 04/15/2024    TP 7.2 04/15/2024    AST 47 04/15/2024    ALT 33 04/15/2024    LIP 36 04/15/2024       Imaging: Reviewed    US GALLBLADDER (CPT=76705)    Result Date: 4/15/2024  CONCLUSION:  Imaging findings concerning for acute cholecystitis, including gallbladder distension, gallstones, and pericholecystic fluid.  There is however no gallbladder wall thickening  or sonographic Daley sign.  Correlate with laboratory values to assess for acute cholecystitis.  Consider further assessment with HIDA scan, if confirmatory examination is required.  Dilation of the common bile duct measuring 8 mm.  Common bile duct stone is not demonstrated during this examination, but choledocholithiasis would be a concern in this setting.  Further assessment can be made with MRCPor ERCP.      Dictated by (CST): Estella Poon MD on 4/15/2024 at 6:22 PM     Finalized by (CST): Estella Poon MD on 4/15/2024 at 6:27 PM           Meds:   Current Facility-Administered Medications   Medication Dose Route Frequency    sodium chloride 0.9% infusion   Intravenous Continuous    acetaminophen (Tylenol Extra Strength) tab 500 mg  500 mg Oral Q4H PRN    ondansetron (Zofran) 4 MG/2ML injection 4 mg  4 mg Intravenous Q6H PRN    prochlorperazine (Compazine) 10 MG/2ML injection 5 mg  5 mg Intravenous Q8H PRN    temazepam (Restoril) cap 15 mg  15 mg Oral Nightly PRN    morphINE PF 2 MG/ML injection 1 mg  1 mg Intravenous Q2H PRN    Or    morphINE PF 2 MG/ML injection 2 mg  2 mg Intravenous Q2H PRN    Or    morphINE PF 4 MG/ML injection 4 mg  4 mg Intravenous Q2H PRN    cefTRIAXone (Rocephin) 1 g in D5W 100 mL IVPB-ADD  1 g Intravenous Q24H    metRONIDAZOLE in sodium chloride 0.79% (Flagyl) 5 mg/mL IVPB premix 500 mg  500 mg Intravenous Q8H         Assessment  Patient Active Problem List   Diagnosis    Rh negative state in antepartum period (HCC)    Anxiety and depression    Vitamin D deficiency    Family history of fatty liver    Fatty liver    Hypercholesterolemia    Iron deficiency anemia    Bilateral breast cysts    Acute cholecystitis       Plan:     Acute cholecystitis  - started IV abx  - surgery consulted - plan for OR tomorrow (or booked for today)  - CLD for now and NPO after midnight  - IV morphine prn pain  - IV zofran prn nausea     Prophylaxis:   DVT with SCDs    Dispo: pending clinical  course    Global A/P  - reviewed labs and vitals from today  - reviewed notes of the day  - cbc, bmp, mag ordered for tomorrow  - discussed need to stay in the hospital today due to acute jordy  - cont IV abx  - discussed with patient/RN and care team    MDM: high level    Heydi Powers MD  4/16/2024  1:05 PM

## 2024-04-16 NOTE — PROGRESS NOTES
General surgery consult    Chart reviewed full dictated consult to follow    Acute cholecystitis.  Plan n.p.o., IV antibiotics.  Laparoscopic cholecystectomy when or allows

## 2024-04-16 NOTE — PLAN OF CARE
Problem: Patient Centered Care  Goal: Patient preferences are identified and integrated in the patient's plan of care  Description: Interventions:  - What would you like us to know as we care for you?   - Provide timely, complete, and accurate information to patient/family  - Incorporate patient and family knowledge, values, beliefs, and cultural backgrounds into the planning and delivery of care  - Encourage patient/family to participate in care and decision-making at the level they choose  - Honor patient and family perspectives and choices  Outcome: Progressing     Problem: Patient/Family Goals  Goal: Patient/Family Long Term Goal  Description: Patient's Long Term Goal:     Interventions:  -   - See additional Care Plan goals for specific interventions  Outcome: Progressing  Goal: Patient/Family Short Term Goal  Description: Patient's Short Term Goal:     Interventions:   -   - See additional Care Plan goals for specific interventions  Outcome: Progressing     Problem: PAIN - ADULT  Goal: Verbalizes/displays adequate comfort level or patient's stated pain goal  Description: INTERVENTIONS:  - Encourage pt to monitor pain and request assistance  - Assess pain using appropriate pain scale  - Administer analgesics based on type and severity of pain and evaluate response  - Implement non-pharmacological measures as appropriate and evaluate response  - Consider cultural and social influences on pain and pain management  - Manage/alleviate anxiety  - Utilize distraction and/or relaxation techniques  - Monitor for opioid side effects  - Notify MD/LIP if interventions unsuccessful or patient reports new pain  - Anticipate increased pain with activity and pre-medicate as appropriate  Outcome: Progressing     Problem: RISK FOR INFECTION - ADULT  Goal: Absence of fever/infection during anticipated neutropenic period  Description: INTERVENTIONS  - Monitor WBC  - Administer growth factors as ordered  - Implement neutropenic  guidelines  Outcome: Progressing     Problem: DISCHARGE PLANNING  Goal: Discharge to home or other facility with appropriate resources  Description: INTERVENTIONS:  - Identify barriers to discharge w/pt and caregiver  - Include patient/family/discharge partner in discharge planning  - Arrange for needed discharge resources and transportation as appropriate  - Identify discharge learning needs (meds, wound care, etc)  - Arrange for interpreters to assist at discharge as needed  - Consider post-discharge preferences of patient/family/discharge partner  - Complete POLST form as appropriate  - Assess patient's ability to be responsible for managing their own health  - Refer to Case Management Department for coordinating discharge planning if the patient needs post-hospital services based on physician/LIP order or complex needs related to functional status, cognitive ability or social support system  Outcome: Progressing     Pt alert and oriented. NPO. IVF infusing. Denies pain/discomfort. Call light within reach. Bed in lowest and locked position. Plan for surgery in the morning.

## 2024-04-16 NOTE — PLAN OF CARE
Patient alert and oriented x 4. Patient denied the need for pain medication. Up independently. Voiding freely. Patient is NPO. IVF and abx. SCDs for VTE prophylaxis. Vital signs monitored. Cough and deep breathe. Bed in lowest position, call light within reach, and non skid socks in place for fall precautions. All needs within reach.  at bedside. Rounding done by nursing staff. Plan is for Lap choly tomorrow       Problem: Patient Centered Care  Goal: Patient preferences are identified and integrated in the patient's plan of care  Description: Interventions:  - What would you like us to know as we care for you? I live with my life partner   - Provide timely, complete, and accurate information to patient/family  - Incorporate patient and family knowledge, values, beliefs, and cultural backgrounds into the planning and delivery of care  - Encourage patient/family to participate in care and decision-making at the level they choose  - Honor patient and family perspectives and choices  Outcome: Progressing     Problem: Patient/Family Goals  Goal: Patient/Family Long Term Goal  Description: Patient's Long Term Goal: To be discharged    Interventions:  - get clearance   - See additional Care Plan goals for specific interventions  Outcome: Progressing  Goal: Patient/Family Short Term Goal  Description: Patient's Short Term Goal: Manage pain    Interventions:   - monitor and assess pain   - See additional Care Plan goals for specific interventions  Outcome: Progressing     Problem: PAIN - ADULT  Goal: Verbalizes/displays adequate comfort level or patient's stated pain goal  Description: INTERVENTIONS:  - Encourage pt to monitor pain and request assistance  - Assess pain using appropriate pain scale  - Administer analgesics based on type and severity of pain and evaluate response  - Implement non-pharmacological measures as appropriate and evaluate response  - Consider cultural and social influences on pain and pain  management  - Manage/alleviate anxiety  - Utilize distraction and/or relaxation techniques  - Monitor for opioid side effects  - Notify MD/LIP if interventions unsuccessful or patient reports new pain  - Anticipate increased pain with activity and pre-medicate as appropriate  Outcome: Progressing     Problem: RISK FOR INFECTION - ADULT  Goal: Absence of fever/infection during anticipated neutropenic period  Description: INTERVENTIONS  - Monitor WBC  - Administer growth factors as ordered  - Implement neutropenic guidelines  Outcome: Progressing     Problem: DISCHARGE PLANNING  Goal: Discharge to home or other facility with appropriate resources  Description: INTERVENTIONS:  - Identify barriers to discharge w/pt and caregiver  - Include patient/family/discharge partner in discharge planning  - Arrange for needed discharge resources and transportation as appropriate  - Identify discharge learning needs (meds, wound care, etc)  - Arrange for interpreters to assist at discharge as needed  - Consider post-discharge preferences of patient/family/discharge partner  - Complete POLST form as appropriate  - Assess patient's ability to be responsible for managing their own health  - Refer to Case Management Department for coordinating discharge planning if the patient needs post-hospital services based on physician/LIP order or complex needs related to functional status, cognitive ability or social support system  Outcome: Progressing

## 2024-04-17 ENCOUNTER — ANESTHESIA (OUTPATIENT)
Dept: SURGERY | Facility: HOSPITAL | Age: 44
End: 2024-04-17

## 2024-04-17 ENCOUNTER — ANESTHESIA EVENT (OUTPATIENT)
Dept: SURGERY | Facility: HOSPITAL | Age: 44
End: 2024-04-17

## 2024-04-17 LAB
ANION GAP SERPL CALC-SCNC: 6 MMOL/L (ref 0–18)
B-HCG UR QL: NEGATIVE
BASOPHILS # BLD AUTO: 0.05 X10(3) UL (ref 0–0.2)
BASOPHILS NFR BLD AUTO: 0.8 %
BUN BLD-MCNC: <5 MG/DL (ref 9–23)
CALCIUM BLD-MCNC: 8.5 MG/DL (ref 8.7–10.4)
CHLORIDE SERPL-SCNC: 113 MMOL/L (ref 98–112)
CO2 SERPL-SCNC: 25 MMOL/L (ref 21–32)
CREAT BLD-MCNC: 0.71 MG/DL
DEPRECATED RDW RBC AUTO: 59.7 FL (ref 35.1–46.3)
EGFRCR SERPLBLD CKD-EPI 2021: 108 ML/MIN/1.73M2 (ref 60–?)
EOSINOPHIL # BLD AUTO: 0.07 X10(3) UL (ref 0–0.7)
EOSINOPHIL NFR BLD AUTO: 1.2 %
ERYTHROCYTE [DISTWIDTH] IN BLOOD BY AUTOMATED COUNT: 21.1 % (ref 11–15)
GLUCOSE BLD-MCNC: 93 MG/DL (ref 70–99)
HCT VFR BLD AUTO: 33.2 %
HGB BLD-MCNC: 11.1 G/DL
IMM GRANULOCYTES # BLD AUTO: 0.01 X10(3) UL (ref 0–1)
IMM GRANULOCYTES NFR BLD: 0.2 %
LYMPHOCYTES # BLD AUTO: 2.45 X10(3) UL (ref 1–4)
LYMPHOCYTES NFR BLD AUTO: 41.1 %
MAGNESIUM SERPL-MCNC: 1.8 MG/DL (ref 1.6–2.6)
MCH RBC QN AUTO: 26.9 PG (ref 26–34)
MCHC RBC AUTO-ENTMCNC: 33.4 G/DL (ref 31–37)
MCV RBC AUTO: 80.4 FL
MONOCYTES # BLD AUTO: 0.52 X10(3) UL (ref 0.1–1)
MONOCYTES NFR BLD AUTO: 8.7 %
NEUTROPHILS # BLD AUTO: 2.86 X10 (3) UL (ref 1.5–7.7)
NEUTROPHILS # BLD AUTO: 2.86 X10(3) UL (ref 1.5–7.7)
NEUTROPHILS NFR BLD AUTO: 48 %
PLATELET # BLD AUTO: 242 10(3)UL (ref 150–450)
POTASSIUM SERPL-SCNC: 4 MMOL/L (ref 3.5–5.1)
RBC # BLD AUTO: 4.13 X10(6)UL
SODIUM SERPL-SCNC: 144 MMOL/L (ref 136–145)
WBC # BLD AUTO: 6 X10(3) UL (ref 4–11)

## 2024-04-17 PROCEDURE — 99233 SBSQ HOSP IP/OBS HIGH 50: CPT | Performed by: HOSPITALIST

## 2024-04-17 PROCEDURE — 0FT44ZZ RESECTION OF GALLBLADDER, PERCUTANEOUS ENDOSCOPIC APPROACH: ICD-10-PCS | Performed by: SURGERY

## 2024-04-17 PROCEDURE — 47562 LAPAROSCOPIC CHOLECYSTECTOMY: CPT | Performed by: SURGERY

## 2024-04-17 RX ORDER — GLYCOPYRROLATE 0.2 MG/ML
INJECTION, SOLUTION INTRAMUSCULAR; INTRAVENOUS AS NEEDED
Status: DISCONTINUED | OUTPATIENT
Start: 2024-04-17 | End: 2024-04-17 | Stop reason: SURG

## 2024-04-17 RX ORDER — SCOLOPAMINE TRANSDERMAL SYSTEM 1 MG/1
1 PATCH, EXTENDED RELEASE TRANSDERMAL
Status: DISCONTINUED | OUTPATIENT
Start: 2024-04-17 | End: 2024-04-17 | Stop reason: HOSPADM

## 2024-04-17 RX ORDER — MAGNESIUM HYDROXIDE 1200 MG/15ML
LIQUID ORAL CONTINUOUS PRN
Status: DISCONTINUED | OUTPATIENT
Start: 2024-04-17 | End: 2024-04-17 | Stop reason: HOSPADM

## 2024-04-17 RX ORDER — HYDROCODONE BITARTRATE AND ACETAMINOPHEN 5; 325 MG/1; MG/1
1 TABLET ORAL EVERY 6 HOURS PRN
Status: DISCONTINUED | OUTPATIENT
Start: 2024-04-17 | End: 2024-04-18

## 2024-04-17 RX ORDER — ONDANSETRON 2 MG/ML
4 INJECTION INTRAMUSCULAR; INTRAVENOUS EVERY 6 HOURS PRN
Status: DISCONTINUED | OUTPATIENT
Start: 2024-04-17 | End: 2024-04-17 | Stop reason: HOSPADM

## 2024-04-17 RX ORDER — MIDAZOLAM HYDROCHLORIDE 1 MG/ML
INJECTION INTRAMUSCULAR; INTRAVENOUS AS NEEDED
Status: DISCONTINUED | OUTPATIENT
Start: 2024-04-17 | End: 2024-04-17 | Stop reason: SURG

## 2024-04-17 RX ORDER — NALOXONE HYDROCHLORIDE 0.4 MG/ML
80 INJECTION, SOLUTION INTRAMUSCULAR; INTRAVENOUS; SUBCUTANEOUS AS NEEDED
Status: DISCONTINUED | OUTPATIENT
Start: 2024-04-17 | End: 2024-04-17 | Stop reason: HOSPADM

## 2024-04-17 RX ORDER — IBUPROFEN 400 MG/1
400 TABLET ORAL EVERY 6 HOURS PRN
Status: DISCONTINUED | OUTPATIENT
Start: 2024-04-17 | End: 2024-04-18

## 2024-04-17 RX ORDER — SODIUM CHLORIDE, SODIUM LACTATE, POTASSIUM CHLORIDE, CALCIUM CHLORIDE 600; 310; 30; 20 MG/100ML; MG/100ML; MG/100ML; MG/100ML
INJECTION, SOLUTION INTRAVENOUS CONTINUOUS
Status: DISCONTINUED | OUTPATIENT
Start: 2024-04-17 | End: 2024-04-17 | Stop reason: HOSPADM

## 2024-04-17 RX ORDER — NEOSTIGMINE METHYLSULFATE 1 MG/ML
INJECTION, SOLUTION INTRAVENOUS AS NEEDED
Status: DISCONTINUED | OUTPATIENT
Start: 2024-04-17 | End: 2024-04-17 | Stop reason: SURG

## 2024-04-17 RX ORDER — HYDROMORPHONE HYDROCHLORIDE 1 MG/ML
0.4 INJECTION, SOLUTION INTRAMUSCULAR; INTRAVENOUS; SUBCUTANEOUS EVERY 5 MIN PRN
Status: DISCONTINUED | OUTPATIENT
Start: 2024-04-17 | End: 2024-04-17 | Stop reason: HOSPADM

## 2024-04-17 RX ORDER — EPHEDRINE SULFATE 50 MG/ML
INJECTION, SOLUTION INTRAVENOUS AS NEEDED
Status: DISCONTINUED | OUTPATIENT
Start: 2024-04-17 | End: 2024-04-17 | Stop reason: SURG

## 2024-04-17 RX ORDER — IBUPROFEN 600 MG/1
600 TABLET ORAL EVERY 6 HOURS PRN
Status: DISCONTINUED | OUTPATIENT
Start: 2024-04-17 | End: 2024-04-18

## 2024-04-17 RX ORDER — HYDROMORPHONE HYDROCHLORIDE 1 MG/ML
0.2 INJECTION, SOLUTION INTRAMUSCULAR; INTRAVENOUS; SUBCUTANEOUS EVERY 5 MIN PRN
Status: DISCONTINUED | OUTPATIENT
Start: 2024-04-17 | End: 2024-04-17 | Stop reason: HOSPADM

## 2024-04-17 RX ORDER — BUPIVACAINE HYDROCHLORIDE AND EPINEPHRINE 2.5; 5 MG/ML; UG/ML
INJECTION, SOLUTION INFILTRATION; PERINEURAL AS NEEDED
Status: DISCONTINUED | OUTPATIENT
Start: 2024-04-17 | End: 2024-04-17 | Stop reason: HOSPADM

## 2024-04-17 RX ORDER — DEXAMETHASONE SODIUM PHOSPHATE 4 MG/ML
VIAL (ML) INJECTION AS NEEDED
Status: DISCONTINUED | OUTPATIENT
Start: 2024-04-17 | End: 2024-04-17 | Stop reason: SURG

## 2024-04-17 RX ORDER — SODIUM CHLORIDE, SODIUM LACTATE, POTASSIUM CHLORIDE, CALCIUM CHLORIDE 600; 310; 30; 20 MG/100ML; MG/100ML; MG/100ML; MG/100ML
INJECTION, SOLUTION INTRAVENOUS CONTINUOUS PRN
Status: DISCONTINUED | OUTPATIENT
Start: 2024-04-17 | End: 2024-04-17 | Stop reason: SURG

## 2024-04-17 RX ORDER — LIDOCAINE HYDROCHLORIDE 10 MG/ML
INJECTION, SOLUTION EPIDURAL; INFILTRATION; INTRACAUDAL; PERINEURAL AS NEEDED
Status: DISCONTINUED | OUTPATIENT
Start: 2024-04-17 | End: 2024-04-17 | Stop reason: SURG

## 2024-04-17 RX ORDER — MORPHINE SULFATE 4 MG/ML
2 INJECTION, SOLUTION INTRAMUSCULAR; INTRAVENOUS EVERY 10 MIN PRN
Status: DISCONTINUED | OUTPATIENT
Start: 2024-04-17 | End: 2024-04-17 | Stop reason: HOSPADM

## 2024-04-17 RX ORDER — HYDROMORPHONE HYDROCHLORIDE 1 MG/ML
0.6 INJECTION, SOLUTION INTRAMUSCULAR; INTRAVENOUS; SUBCUTANEOUS EVERY 5 MIN PRN
Status: DISCONTINUED | OUTPATIENT
Start: 2024-04-17 | End: 2024-04-17 | Stop reason: HOSPADM

## 2024-04-17 RX ORDER — MORPHINE SULFATE 4 MG/ML
4 INJECTION, SOLUTION INTRAMUSCULAR; INTRAVENOUS EVERY 10 MIN PRN
Status: DISCONTINUED | OUTPATIENT
Start: 2024-04-17 | End: 2024-04-17 | Stop reason: HOSPADM

## 2024-04-17 RX ORDER — MORPHINE SULFATE 10 MG/ML
6 INJECTION, SOLUTION INTRAMUSCULAR; INTRAVENOUS EVERY 10 MIN PRN
Status: DISCONTINUED | OUTPATIENT
Start: 2024-04-17 | End: 2024-04-17 | Stop reason: HOSPADM

## 2024-04-17 RX ORDER — ROCURONIUM BROMIDE 10 MG/ML
INJECTION, SOLUTION INTRAVENOUS AS NEEDED
Status: DISCONTINUED | OUTPATIENT
Start: 2024-04-17 | End: 2024-04-17 | Stop reason: SURG

## 2024-04-17 RX ADMIN — SODIUM CHLORIDE, SODIUM LACTATE, POTASSIUM CHLORIDE, CALCIUM CHLORIDE: 600; 310; 30; 20 INJECTION, SOLUTION INTRAVENOUS at 08:25:00

## 2024-04-17 RX ADMIN — SODIUM CHLORIDE, SODIUM LACTATE, POTASSIUM CHLORIDE, CALCIUM CHLORIDE: 600; 310; 30; 20 INJECTION, SOLUTION INTRAVENOUS at 07:33:00

## 2024-04-17 RX ADMIN — DEXAMETHASONE SODIUM PHOSPHATE 8 MG: 4 MG/ML VIAL (ML) INJECTION at 07:48:00

## 2024-04-17 RX ADMIN — ROCURONIUM BROMIDE 20 MG: 10 INJECTION, SOLUTION INTRAVENOUS at 07:41:00

## 2024-04-17 RX ADMIN — GLYCOPYRROLATE 1 MG: 0.2 INJECTION, SOLUTION INTRAMUSCULAR; INTRAVENOUS at 08:05:00

## 2024-04-17 RX ADMIN — EPHEDRINE SULFATE 5 MG: 50 INJECTION, SOLUTION INTRAVENOUS at 07:41:00

## 2024-04-17 RX ADMIN — ROCURONIUM BROMIDE 10 MG: 10 INJECTION, SOLUTION INTRAVENOUS at 07:38:00

## 2024-04-17 RX ADMIN — MIDAZOLAM HYDROCHLORIDE 2 MG: 1 INJECTION INTRAMUSCULAR; INTRAVENOUS at 07:33:00

## 2024-04-17 RX ADMIN — LIDOCAINE HYDROCHLORIDE 50 MG: 10 INJECTION, SOLUTION EPIDURAL; INFILTRATION; INTRACAUDAL; PERINEURAL at 07:37:00

## 2024-04-17 RX ADMIN — NEOSTIGMINE METHYLSULFATE 5 MG: 1 INJECTION, SOLUTION INTRAVENOUS at 08:05:00

## 2024-04-17 RX ADMIN — SCOLOPAMINE TRANSDERMAL SYSTEM 1 PATCH: 1 PATCH, EXTENDED RELEASE TRANSDERMAL at 07:45:00

## 2024-04-17 RX ADMIN — ONDANSETRON 4 MG: 2 INJECTION INTRAMUSCULAR; INTRAVENOUS at 08:00:00

## 2024-04-17 NOTE — ANESTHESIA POSTPROCEDURE EVALUATION
Patient: Gianna Kaur    Procedure Summary       Date: 04/17/24 Room / Location: [facility] MAIN OR  / [facility] MAIN OR    Anesthesia Start: 0732 Anesthesia Stop: 0831    Procedure: Laparoscopic cholecystectomy (Abdomen) Diagnosis: (Acute cholecystitis)    Surgeons: Tj Staples MD Anesthesiologist: Leticia Cotto MD    Anesthesia Type: general ASA Status: 2            Anesthesia Type: general    Vitals Value Taken Time   BP 95/71 04/17/24 0830   Temp 97.2 °F (36.2 °C) 04/17/24 0830   Pulse 77 04/17/24 0830   Resp 20 04/17/24 0830   SpO2 99 % 04/17/24 0830   Vitals shown include unfiled device data.    [facility] AN Post Evaluation:   Patient Evaluated in PACU  Patient Participation: complete - patient participated  Level of Consciousness: awake  Pain Management: adequate  Airway Patency:patent  Dental exam unchanged from preop  Yes    Nausea/Vomiting: none  Cardiovascular Status: acceptable and hemodynamically stable  Respiratory Status: acceptable, nonlabored ventilation, spontaneous ventilation and nasal cannula  Postoperative Hydration acceptable      Leticia Cotto MD  4/17/2024 8:31 AM

## 2024-04-17 NOTE — OPERATIVE REPORT
[facility]    PATIENT'S NAME: SAGRARIO MARTIN   ATTENDING PHYSICIAN: Kia Borrego MD   OPERATING PHYSICIAN: Tj Staples MD   PATIENT ACCOUNT#:   [id number]    LOCATION:  [place] [address]  MEDICAL RECORD #:   [id number]       YOB: 1980  ADMISSION DATE:       04/15/2024      OPERATION DATE:  04/17/2024    OPERATIVE REPORT      PREOPERATIVE DIAGNOSIS:  Acute cholecystitis.  POSTOPERATIVE DIAGNOSIS:  Acute cholecystitis.  PROCEDURE:  Laparoscopic cholecystectomy.    ASSISTANT:  SHAMIKA Garcia    ESTIMATED BLOOD LOSS:  5 mL.    COMPLICATIONS:  None.    ANESTHESIA:  General.    DISPOSITION:  To Recovery, tolerated well.    INDICATIONS:  The patient is a pleasant 43-year-old with the above complaint.  Consent obtained.     OPERATIVE TECHNIQUE:  She was taken to surgery.  She was prepped and draped in the usual sterile fashion.  Veress needle placed at [place].  Abdomen insufflated.  Four trocars placed using standard open technique.  Exploration reveals acute edematous cholecystitis.  The gallbladder is retracted.  Cystic artery and cystic duct dissected to their junction with the gallbladder.  Critical view was obtained.  Clips are placed on either side and they are ligated.  The gallbladder is taken from the liver using electrocautery and retrieved using an endobag.  Right upper quadrant irrigated.  Hemostasis assured.  Trocars removed.  Fascia closed with 0 Vicryl.  Skin closed with 3-0 Monocryl, benzoin, and Steri-Strips.      Dictated By Tj Staples MD  d: 04/17/2024 08:10:44  t: 04/17/2024 08:26:11  [facility] [id number]  GL/    cc: Gage Curry MD

## 2024-04-17 NOTE — DISCHARGE INSTRUCTIONS
Ice pack.  Ok to shower tomorrow  15 lb lifting 4 weeks  Ibuprofen or tylenol for pain  Fu 2 weeks

## 2024-04-17 NOTE — ANESTHESIA PREPROCEDURE EVALUATION
Anesthesia PreOp Note    HPI:     Gianna Kaur is a 43 year old female who presents for preoperative consultation requested by: Tj Staples MD    Date of Surgery: 4/15/2024 - 4/17/2024    Procedure(s):  Laparoscopic cholecystectomy  Indication: Acute cholecystitis    Relevant Problems   No relevant active problems       NPO:  Last Liquid Consumption Date: 04/16/24  Last Liquid Consumption Time: 2300  Last Solid Consumption Date: 04/16/24  Last Solid Consumption Time: 2300  Last Liquid Consumption Date: 04/16/24          History Review:  Patient Active Problem List    Diagnosis Date Noted    Acute cholecystitis 04/15/2024    Iron deficiency anemia 02/15/2024    Bilateral breast cysts 02/15/2024    Hypercholesterolemia 04/25/2023    Fatty liver 03/29/2023    Vitamin D deficiency 01/31/2023    Family history of fatty liver 01/31/2023    Anxiety and depression 04/17/2021    Rh negative state in antepartum period (HCC) 06/14/2017       Past Medical History:    Depression    tx for a few months    Vertigo       History reviewed. No pertinent surgical history.    Medications Prior to Admission   Medication Sig Dispense Refill Last Dose    Ferrous Sulfate (IRON) 325 (65 Fe) MG Oral Tab iron 325 mg (65 mg iron) tablet, [RxNorm: 249263]   4/14/2024 at M    rosuvastatin 5 MG Oral Tab Take 1 tablet (5 mg total) by mouth nightly. 90 tablet 0 Past Week    escitalopram 10 MG Oral Tab Take 1 tablet (10 mg total) by mouth daily. 90 tablet 0 More than a month     Current Facility-Administered Medications Ordered in Epic   Medication Dose Route Frequency Provider Last Rate Last Admin    sodium chloride 0.9% infusion   Intravenous Continuous Filomena León  mL/hr at 04/17/24 0026 New Bag at 04/17/24 0026    [Transfer Hold] acetaminophen (Tylenol Extra Strength) tab 500 mg  500 mg Oral Q4H PRN Filomena León MD        [Transfer Hold] ondansetron (Zofran) 4 MG/2ML injection 4 mg  4 mg Intravenous Q6H PRN  Filomena León MD        [Transfer Hold] prochlorperazine (Compazine) 10 MG/2ML injection 5 mg  5 mg Intravenous Q8H PRN Filomena León MD        [Transfer Hold] temazepam (Restoril) cap 15 mg  15 mg Oral Nightly PRN Filomena León MD        [Transfer Hold] morphINE PF 2 MG/ML injection 1 mg  1 mg Intravenous Q2H PRN Filomena León MD        Or    [Transfer Hold] morphINE PF 2 MG/ML injection 2 mg  2 mg Intravenous Q2H PRN Filomena León MD        Or    [Transfer Hold] morphINE PF 4 MG/ML injection 4 mg  4 mg Intravenous Q2H PRN Filomena León MD        cefTRIAXone (Rocephin) 1 g in D5W 100 mL IVPB-ADD  1 g Intravenous Q24H Filomena León  mL/hr at 04/16/24 1807 1 g at 04/16/24 1807    metRONIDAZOLE in sodium chloride 0.79% (Flagyl) 5 mg/mL IVPB premix 500 mg  500 mg Intravenous Q8H Filomena León  mL/hr at 04/17/24 0520 500 mg at 04/17/24 0520     No current [facility]-ordered outpatient medications on file.       No Known Allergies    Family History   Problem Relation Age of Onset    Cancer Father         prostate    Other (Other) Mother         Cirosis, Hep B    Diabetes Other         maternal side     Social History     Socioeconomic History    Marital status: Single   Tobacco Use    Smoking status: Never    Smokeless tobacco: Never   Vaping Use    Vaping status: Never Used   Substance and Sexual Activity    Alcohol use: Not Currently     Comment: Socially     Drug use: Never    Sexual activity: Yes     Partners: Male     Birth control/protection: Condom       Available pre-op labs reviewed.  Lab Results   Component Value Date    WBC 6.0 04/17/2024    RBC 4.13 04/17/2024    HGB 11.1 (L) 04/17/2024    HCT 33.2 (L) 04/17/2024    MCV 80.4 04/17/2024    MCH 26.9 04/17/2024    MCHC 33.4 04/17/2024    RDW 21.1 (H) 04/17/2024    .0 04/17/2024    PREGU Negative 04/16/2024     Lab Results   Component Value Date     04/17/2024    K 4.0 04/17/2024     (H) 04/17/2024    CO2 25.0  04/17/2024    BUN <5 (L) 04/17/2024    CREATSERUM 0.71 04/17/2024    GLU 93 04/17/2024    CA 8.5 (L) 04/17/2024          Vital Signs:  Body mass index is 25.4 kg/m².   height is 1.626 m (5' 4\") and weight is 67.1 kg (148 lb). Her oral temperature is 98 °F (36.7 °C). Her blood pressure is 120/68 and her pulse is 76. Her respiration is 16 and oxygen saturation is 99%.   Vitals:    04/16/24 1546 04/16/24 2119 04/17/24 0513 04/17/24 0652   BP: 115/76 127/80 104/72 120/68   Pulse: 64 67 72 76   Resp: 18 16 16 16   Temp: 98.1 °F (36.7 °C) 98.7 °F (37.1 °C) 98.7 °F (37.1 °C) 98 °F (36.7 °C)   TempSrc: Temporal Oral Oral Oral   SpO2: 99% 100% 100% 99%   Weight:       Height:            Anesthesia Evaluation     Patient summary reviewed and Nursing notes reviewed    History of anesthetic complications (PONV)   Airway   Mallampati: I  TM distance: >3 FB  Neck ROM: full  Dental - Dentition appears grossly intact     Pulmonary - negative ROS and normal exam   Cardiovascular - normal exam  Exercise tolerance: good    Neuro/Psych - negative ROS   (+)   depression      GI/Hepatic/Renal    (+) liver disease (fatty liver)    Comments: + acute cholecystitis     Endo/Other - negative ROS   Abdominal  - normal exam                 Anesthesia Plan:   ASA:  2  Plan:   General  Airway:  ETT  Post-op Pain Management: IV analgesics  Plan Comments: Urine pregnancy test negative yesterday.   Scopolamine patch ordered for PONV prophylaxis. Will also give Decadron and Ondansetron.   Informed Consent Plan and Risks Discussed With:  Patient  Discussed plan with:  Surgeon      I have informed Gianna Kaur and/or legal guardian or family member of the nature of the anesthetic plan, benefits, risks including possible dental damage if relevant, major complications, and any alternative forms of anesthetic management.   All of the patient's questions were answered to the best of my ability. The patient desires the anesthetic management as  planned.  Leticia Cotto MD  4/17/2024 7:11 AM  Present on Admission:  **None**

## 2024-04-17 NOTE — BRIEF OP NOTE
Pre-Operative Diagnosis: Acute cholecystitis     Post-Operative Diagnosis: Acute cholecystitis      Procedure Performed:   Laparoscopic cholecystectomy    Surgeons and Role:     * Tj Staples MD - Primary    Assistant(s):  Surgical Assistant.: Heron Gusman     Surgical Findings: acute chol     Specimen: gb     Estimated Blood Loss: 5cc    Dictation Number:        Tj Staples MD  4/17/2024  8:05 AM

## 2024-04-17 NOTE — PLAN OF CARE
Patient alert and oriented x 4. Post op day 0. Norco and ibuprofen administered for pain as needed. 5x lap sites. Dressing is 2x2 ,tegaderm and steristrips with bandaid. Up independently. Voiding freely. Patients diet is clear liquids. SCDs for VTE prophylaxis. Vital signs monitored. Cough and deep breathe in addition to inentive spirometer. Bed in lowest position, call light within reach, and non skid socks in place for fall precautions. All needs within reach. Family at bedside. Rounding done by nursing staff. Plan for discharge is home tomorrow once cleared.       Problem: Patient Centered Care  Goal: Patient preferences are identified and integrated in the patient's plan of care  Description: Interventions:  - What would you like us to know as we care for you? I live with my life partner   - Provide timely, complete, and accurate information to patient/family  - Incorporate patient and family knowledge, values, beliefs, and cultural backgrounds into the planning and delivery of care  - Encourage patient/family to participate in care and decision-making at the level they choose  - Honor patient and family perspectives and choices  Outcome: Progressing     Problem: Patient/Family Goals  Goal: Patient/Family Long Term Goal  Description: Patient's Long Term Goal: To be discharged    Interventions:  - get clearance   - See additional Care Plan goals for specific interventions  Outcome: Progressing  Goal: Patient/Family Short Term Goal  Description: Patient's Short Term Goal: Manage pain    Interventions:   - monitor and assess pain   - See additional Care Plan goals for specific interventions  Outcome: Progressing     Problem: PAIN - ADULT  Goal: Verbalizes/displays adequate comfort level or patient's stated pain goal  Description: INTERVENTIONS:  - Encourage pt to monitor pain and request assistance  - Assess pain using appropriate pain scale  - Administer analgesics based on type and severity of pain and evaluate  response  - Implement non-pharmacological measures as appropriate and evaluate response  - Consider cultural and social influences on pain and pain management  - Manage/alleviate anxiety  - Utilize distraction and/or relaxation techniques  - Monitor for opioid side effects  - Notify MD/LIP if interventions unsuccessful or patient reports new pain  - Anticipate increased pain with activity and pre-medicate as appropriate  Outcome: Progressing     Problem: RISK FOR INFECTION - ADULT  Goal: Absence of fever/infection during anticipated neutropenic period  Description: INTERVENTIONS  - Monitor WBC  - Administer growth factors as ordered  - Implement neutropenic guidelines  Outcome: Progressing     Problem: DISCHARGE PLANNING  Goal: Discharge to home or other facility with appropriate resources  Description: INTERVENTIONS:  - Identify barriers to discharge w/pt and caregiver  - Include patient/family/discharge partner in discharge planning  - Arrange for needed discharge resources and transportation as appropriate  - Identify discharge learning needs (meds, wound care, etc)  - Arrange for interpreters to assist at discharge as needed  - Consider post-discharge preferences of patient/family/discharge partner  - Complete POLST form as appropriate  - Assess patient's ability to be responsible for managing their own health  - Refer to Case Management Department for coordinating discharge planning if the patient needs post-hospital services based on physician/LIP order or complex needs related to functional status, cognitive ability or social support system  Outcome: Progressing

## 2024-04-17 NOTE — INTERVAL H&P NOTE
Pre-op Diagnosis: Acute cholecystitis    The above referenced H&P was reviewed by Tj Staples MD on 4/17/2024, the patient was examined and no significant changes have occurred in the patient's condition since the H&P was performed.  I discussed with the patient and/or legal representative the potential benefits, risks and side effects of this procedure; the likelihood of the patient achieving goals; and potential problems that might occur during recuperation.  I discussed reasonable alternatives to the procedure, including risks, benefits and side effects related to the alternatives and risks related to not receiving this procedure.  We will proceed with procedure as planned.

## 2024-04-17 NOTE — PROGRESS NOTES
[facility]  part of [facility]    Progress Note    Gianna Kaur Patient Status:  Observation    1980 MRN [id number]   Location [facility] 4W/SW/SE Attending Heydi Powers MD   Hosp Day # 0 PCP Gage Curry MD     Chief Complaint: acute jordy    SUBJECTIVE:    No acute events overnight.  Patient denies chest pain, sob.  No fevers/chills.  No n/v/abd pain.  Seen after surgery, has some pain    10 ROS completed and otherwise negative.    OBJECTIVE:  Vital signs in last 24 hours:  /69 (BP Location: Left arm)   Pulse 64   Temp 98.7 °F (37.1 °C) (Oral)   Resp 16   Ht 5' 4\" (1.626 m)   Wt 148 lb (67.1 kg)   LMP  (Approximate)   SpO2 98%   BMI 25.40 kg/m²     Intake/Output:    Intake/Output Summary (Last 24 hours) at 2024 1557  Last data filed at 2024 1200  Gross per 24 hour   Intake 500 ml   Output 405 ml   Net 95 ml       Wt Readings from Last 3 Encounters:   04/15/24 148 lb (67.1 kg)   24 148 lb (67.1 kg)   02/15/24 147 lb (66.7 kg)       Exam     Gen: No acute distress  Pulm: Lungs clear, normal respiratory effort  CV: Heart with regular rate and rhythm  Abd: Abdomen , incision cdi  Neuro: No acute focal deficits  MSK: Full range of motion in extremities  Skin: Warm and dry  Psych: Normal affect  Ext: no c/c/e    Data Review:     Labs:   Lab Results   Component Value Date    WBC 6.0 2024    HGB 11.1 2024    HCT 33.2 2024    .0 2024    CREATSERUM 0.71 2024    BUN <5 2024     2024    K 4.0 2024     2024    CO2 25.0 2024    GLU 93 2024    CA 8.5 2024    MG 1.8 2024       Imaging: Reviewed    US GALLBLADDER (CPT=76705)    Result Date: 4/15/2024  CONCLUSION:  Imaging findings concerning for acute cholecystitis, including gallbladder distension, gallstones, and pericholecystic fluid.  There is however no gallbladder wall thickening or  sonographic Daley sign.  Correlate with laboratory values to assess for acute cholecystitis.  Consider further assessment with HIDA scan, if confirmatory examination is required.  Dilation of the common bile duct measuring 8 mm.  Common bile duct stone is not demonstrated during this examination, but choledocholithiasis would be a concern in this setting.  Further assessment can be made with MRCPor ERCP.      Dictated by (CST): Estella Poon MD on 4/15/2024 at 6:22 PM     Finalized by (CST): Estella Poon MD on 4/15/2024 at 6:27 PM           Meds:   Current Facility-Administered Medications   Medication Dose Route Frequency    HYDROcodone-acetaminophen (Norco) 5-325 MG per tab 1 tablet  1 tablet Oral Q6H PRN    ibuprofen (Motrin) tab 400 mg  400 mg Oral Q6H PRN    Or    ibuprofen (Motrin) tab 600 mg  600 mg Oral Q6H PRN    sodium chloride 0.9% infusion   Intravenous Continuous    acetaminophen (Tylenol Extra Strength) tab 500 mg  500 mg Oral Q4H PRN    ondansetron (Zofran) 4 MG/2ML injection 4 mg  4 mg Intravenous Q6H PRN    prochlorperazine (Compazine) 10 MG/2ML injection 5 mg  5 mg Intravenous Q8H PRN    temazepam (Restoril) cap 15 mg  15 mg Oral Nightly PRN    morphINE PF 2 MG/ML injection 1 mg  1 mg Intravenous Q2H PRN    Or    morphINE PF 2 MG/ML injection 2 mg  2 mg Intravenous Q2H PRN    Or    morphINE PF 4 MG/ML injection 4 mg  4 mg Intravenous Q2H PRN    cefTRIAXone (Rocephin) 1 g in D5W 100 mL IVPB-ADD  1 g Intravenous Q24H    metRONIDAZOLE in sodium chloride 0.79% (Flagyl) 5 mg/mL IVPB premix 500 mg  500 mg Intravenous Q8H         Assessment  Patient Active Problem List   Diagnosis    Rh negative state in antepartum period (HCC)    Anxiety and depression    Vitamin D deficiency    Family history of fatty liver    Fatty liver    Hypercholesterolemia    Iron deficiency anemia    Bilateral breast cysts    Acute cholecystitis       Plan:     Acute cholecystitis  - started IV abx  - surgery consult  appreciated  - IV morphine prn pain  - IV zofran prn nausea   -now s/p lap cholecystectomy pod # 0  -advance diet  -possible home later today if tolerates diet  -plan tylenol and motrin on dc    Prophylaxis:   DVT with SCDs    Dispo: pending clinical course    Global A/P  -Dispo, home pending post op course today vs. tomorrow  - discussed with patient/RN and care team    MDM: high level    SURYA PICKERING MD

## 2024-04-17 NOTE — DISCHARGE SUMMARY
[facility]  part of [facility]    Discharge Summary    Gianna Kaur Patient Status:  Observation    1980 MRN [id number]   Location [facility] 4W/SW/SE Attending Kia Borrego MD   Hosp Day # 0 PCP Gage Curry MD     Date of Admission: 4/15/2024 Disposition: Home or Self Care     Date of Discharge: 24      Admitting Diagnosis: Acute cholecystitis [K81.0]    Hospital Discharge Diagnoses:  acute cholecystitis    Lace+ Score: 50  59-90 High Risk  29-58 Medium Risk  0-28   Low Risk.    TCM Follow-Up Recommendation:  LACE 29-58: Moderate Risk of readmission after discharge from the hospital.      Problem List:   Patient Active Problem List   Diagnosis    Rh negative state in antepartum period (HCC)    Anxiety and depression    Vitamin D deficiency    Family history of fatty liver    Fatty liver    Hypercholesterolemia    Iron deficiency anemia    Bilateral breast cysts    Acute cholecystitis       Reason for Admission:   Acute cholecystitis    Physical Exam:   General appearance: alert, appears stated age and cooperative  Pulmonary:  clear to auscultation bilaterally  Cardiovascular: S1, S2 normal, no murmur, click, rub or gallop, regular rate and rhythm  Abdominal: soft, +bowel sounds incision cdi  Extremities: extremities normal, atraumatic, no cyanosis or edema  Psychiatric: calm      History of Present Illness:   Per Dr. León  Patient is a 43-year-old  female who came into the emergency department for evaluation of recurrent right upper quadrant pain associated with nausea since this morning after she ate a high-fat meal. CBC showed white blood cell count of 12.4 with left shift. Chemistry and liver function tests were unremarkable. Gallbladder ultrasound showed acute cholecystitis with gallstones, common bile duct 8 mm. Started on IV antibiotics, and she will be admitted to the hospital for further management.     Hospital Course:   Acute  cholecystitis  - started IV abx  - surgery consult appreciated  - IV morphine prn pain  - IV zofran prn nausea      -now s/p lap cholecystectomy pod # 1  -advance diet  -possible home later today if tolerates diet  -  venkata tylenol and motrin on dc  Consultations: gen surgery    Procedures: s/p lap cholecystectomy    Complications: n/a    Discharge Condition: Good    Discharge Medications:      Discharge Medications        CHANGE how you take these medications        Instructions Prescription details   Iron 325 (65 Fe) MG Tabs  What changed: Another medication with the same name was removed. Continue taking this medication, and follow the directions you see here.      iron 325 mg (65 mg iron) tablet, [RxNorm: 548818]   Refills: 0            CONTINUE taking these medications        Instructions Prescription details   escitalopram 10 MG Tabs  Commonly known as: Lexapro      Take 1 tablet (10 mg total) by mouth daily.   Quantity: 90 tablet  Refills: 0     rosuvastatin 5 MG Tabs  Commonly known as: Crestor      Take 1 tablet (5 mg total) by mouth nightly.   Quantity: 90 tablet  Refills: 0              Follow up Visits: Follow-up with pcp in 1 week    Follow up Labs: n/a     Other Discharge Instructions: follow-up with gen surgery as instructed    SURYA PICKERING MD  4/17/2024  10:59 AM    > 35 min

## 2024-04-17 NOTE — ANESTHESIA PROCEDURE NOTES
Airway  Date/Time: 4/17/2024 7:39 AM  Urgency: Elective    Airway not difficult    General Information and Staff    Patient location during procedure: OR  Anesthesiologist: Leticia Cotto MD  Performed: anesthesiologist   Performed by: Leticia Cotto MD  Authorized by: Leticia Cotto MD      Indications and Patient Condition  Indications for airway management: anesthesia  Spontaneous Ventilation: absent  Sedation level: deep  Preoxygenated: yes  Patient position: sniffing  Mask difficulty assessment: 1 - vent by mask    Final Airway Details  Final airway type: endotracheal airway      Successful airway: ETT  Cuffed: yes   Successful intubation technique: direct laryngoscopy  Endotracheal tube insertion site: oral  Blade: Clarissa  Blade size: #3  ETT size (mm): 7.0    Cormack-Lehane Classification: grade I - full view of glottis  Placement verified by: capnometry   Measured from: lips  ETT to lips (cm): 22  Number of attempts at approach: 1  Number of other approaches attempted: 0    Additional Comments  Intubated easily on first attempt, no dental or soft tissue damage. No signs of aspiration.

## 2024-04-18 VITALS
SYSTOLIC BLOOD PRESSURE: 109 MMHG | RESPIRATION RATE: 17 BRPM | HEIGHT: 64 IN | WEIGHT: 148 LBS | TEMPERATURE: 98 F | OXYGEN SATURATION: 100 % | DIASTOLIC BLOOD PRESSURE: 67 MMHG | BODY MASS INDEX: 25.27 KG/M2 | HEART RATE: 69 BPM

## 2024-04-18 PROCEDURE — 99239 HOSP IP/OBS DSCHRG MGMT >30: CPT | Performed by: HOSPITALIST

## 2024-04-18 NOTE — PROGRESS NOTES
[facility]  part of [facility]    Progress Note    Gianna Kaur Patient Status:  Observation    1980 MRN [id number]   Location [facility] 4W/SW/SE Attending Kia Borrego MD   Hosp Day # 0 PCP Gage Curry MD       Subjective:   POD1 lap jordy. Pt feeling well, pain controlled. Tolerating diet.    Objective:   Vital Signs:  Blood pressure 114/79, pulse 68, temperature 98.9 °F (37.2 °C), temperature source Oral, resp. rate 16, height 5' 4\" (1.626 m), weight 148 lb (67.1 kg), SpO2 98%, not currently breastfeeding.    Physical Exam     General: No acute distress. Alert and oriented x 3.  HEENT: Moist mucous membranes. Anicteric.   Neck: Supple, No JVD.   Respiratory: Nonlabored resp.  Cardiovascular: Regular rate.   Abdomen: Soft, expected incisional tenderness, no rebound tnd, no guarding, nondistended.  No masses. Normal bowel sounds. Incisions c/d/i  Neurologic: No focal neurological deficits.  Musculoskeletal: Full range of motion of all extremities. No calf tenderness. No swelling noted.  Integument: No lesions. No erythema.  Psychiatric: Appropriate mood and affect.         Assessment and Plan:     Acute cholecystitis    Pt recovering well s/p lap jordy. Stable for discharge home from surgical standpoint. Follow up in 2 weeks for postoperative appointment.    Results:     Lab Results   Component Value Date    WBC 6.0 2024    HGB 11.1 (L) 2024    HCT 33.2 (L) 2024    .0 2024    CREATSERUM 0.71 2024    BUN <5 (L) 2024     2024    K 4.0 2024     (H) 2024    CO2 25.0 2024    GLU 93 2024    CA 8.5 (L) 2024    ALB 4.2 04/15/2024    ALKPHO 73 04/15/2024    BILT 0.4 04/15/2024    TP 7.2 04/15/2024    AST 47 (H) 04/15/2024    ALT 33 04/15/2024    T4F 0.8 2020    TSH 1.882 2024    LIP 36 04/15/2024    MG 1.8 2024    B12 468 2024       No results found.                 Melo Silvestre PA-C  4/18/2024

## 2024-04-18 NOTE — PLAN OF CARE
Patient AOX4. Room air. General diet, tolerated well. No complaints of nausea. SCDs for dvt prophylaxis. BM this afternoon. Passing flatus. IV abx. Dressing clean dry and intact. PRN ibuprofen given for pain management. Cleared for discharge by MDS. Discharging home. Discharge instructions given at bedside.    Problem: Patient Centered Care  Goal: Patient preferences are identified and integrated in the patient's plan of care  Description: Interventions:  - What would you like us to know as we care for you? I live with my life partner   - Provide timely, complete, and accurate information to patient/family  - Incorporate patient and family knowledge, values, beliefs, and cultural backgrounds into the planning and delivery of care  - Encourage patient/family to participate in care and decision-making at the level they choose  - Honor patient and family perspectives and choices  Outcome: Adequate for Discharge     Problem: Patient/Family Goals  Goal: Patient/Family Long Term Goal  Description: Patient's Long Term Goal: To be discharged    Interventions:  - get clearance   - See additional Care Plan goals for specific interventions  Outcome: Adequate for Discharge  Goal: Patient/Family Short Term Goal  Description: Patient's Short Term Goal: Manage pain    Interventions:   - monitor and assess pain   - See additional Care Plan goals for specific interventions  Outcome: Adequate for Discharge     Problem: PAIN - ADULT  Goal: Verbalizes/displays adequate comfort level or patient's stated pain goal  Description: INTERVENTIONS:  - Encourage pt to monitor pain and request assistance  - Assess pain using appropriate pain scale  - Administer analgesics based on type and severity of pain and evaluate response  - Implement non-pharmacological measures as appropriate and evaluate response  - Consider cultural and social influences on pain and pain management  - Manage/alleviate anxiety  - Utilize distraction and/or relaxation  techniques  - Monitor for opioid side effects  - Notify MD/LIP if interventions unsuccessful or patient reports new pain  - Anticipate increased pain with activity and pre-medicate as appropriate  Outcome: Adequate for Discharge     Problem: RISK FOR INFECTION - ADULT  Goal: Absence of fever/infection during anticipated neutropenic period  Description: INTERVENTIONS  - Monitor WBC  - Administer growth factors as ordered  - Implement neutropenic guidelines  Outcome: Adequate for Discharge     Problem: DISCHARGE PLANNING  Goal: Discharge to home or other facility with appropriate resources  Description: INTERVENTIONS:  - Identify barriers to discharge w/pt and caregiver  - Include patient/family/discharge partner in discharge planning  - Arrange for needed discharge resources and transportation as appropriate  - Identify discharge learning needs (meds, wound care, etc)  - Arrange for interpreters to assist at discharge as needed  - Consider post-discharge preferences of patient/family/discharge partner  - Complete POLST form as appropriate  - Assess patient's ability to be responsible for managing their own health  - Refer to Case Management Department for coordinating discharge planning if the patient needs post-hospital services based on physician/LIP order or complex needs related to functional status, cognitive ability or social support system  Outcome: Adequate for Discharge

## 2024-04-19 ENCOUNTER — PATIENT OUTREACH (OUTPATIENT)
Dept: CASE MANAGEMENT | Age: 44
End: 2024-04-19

## 2024-05-02 ENCOUNTER — TELEPHONE (OUTPATIENT)
Dept: SURGERY | Facility: CLINIC | Age: 44
End: 2024-05-02

## 2024-05-02 ENCOUNTER — HOSPITAL ENCOUNTER (OUTPATIENT)
Dept: MAMMOGRAPHY | Facility: HOSPITAL | Age: 44
Discharge: HOME | End: 2024-05-02
Attending: INTERNAL MEDICINE
Payer: COMMERCIAL

## 2024-05-02 ENCOUNTER — HOSPITAL ENCOUNTER (OUTPATIENT)
Dept: ULTRASOUND IMAGING | Facility: HOSPITAL | Age: 44
Discharge: HOME | End: 2024-05-02
Attending: INTERNAL MEDICINE
Payer: COMMERCIAL

## 2024-05-02 DIAGNOSIS — N60.01 BILATERAL BREAST CYSTS: ICD-10-CM

## 2024-05-02 DIAGNOSIS — N60.02 BILATERAL BREAST CYSTS: ICD-10-CM

## 2024-05-02 PROCEDURE — 77066 DX MAMMO INCL CAD BI: CPT | Performed by: INTERNAL MEDICINE

## 2024-05-02 PROCEDURE — 76642 ULTRASOUND BREAST LIMITED: CPT | Performed by: INTERNAL MEDICINE

## 2024-05-02 PROCEDURE — 77062 BREAST TOMOSYNTHESIS BI: CPT | Performed by: INTERNAL MEDICINE

## 2024-05-02 NOTE — TELEPHONE ENCOUNTER
Patient is due for her 2 week post op today, informed no openings until 5/8. Please contact patient at [phone],thanks.

## 2024-05-08 ENCOUNTER — OFFICE VISIT (OUTPATIENT)
Dept: SURGERY | Facility: CLINIC | Age: 44
End: 2024-05-08

## 2024-05-08 DIAGNOSIS — Z48.89 ENCOUNTER FOR POSTOPERATIVE CARE: Primary | ICD-10-CM

## 2024-05-08 PROCEDURE — 99024 POSTOP FOLLOW-UP VISIT: CPT

## 2024-05-08 NOTE — PROGRESS NOTES
S:  Gianna Kaur is a 43 year old female sp Sue Leee  Doing well, tolerating a general diet, generally normal bowel movements, no calf tenderness nor lower extremity edema, no shortness of breath, no chest pain.       O:  LMP 03/18/2024 (Exact Date)   GEN:  No acute distress  L: nonlabored respirations  H: reg rate  Abd:  Soft, NT,ND, incisions C/D/I, no erythema.  Extr: No edema, no calf tenderness      Assessment   1. Encounter for postoperative care        Doing well sp Sue Torres.  Continue to avoid heavy lifting for another few weeks.  Maintain a low fat diet.  Maintain good hydration.  F/u prn.         Melo Silvestre PA-C

## 2024-05-09 DIAGNOSIS — R92.8 ABNORMAL MAMMOGRAM OF BOTH BREASTS: Primary | ICD-10-CM

## 2024-09-05 ENCOUNTER — PATIENT MESSAGE (OUTPATIENT)
Dept: INTERNAL MEDICINE CLINIC | Facility: CLINIC | Age: 44
End: 2024-09-05

## 2024-09-05 DIAGNOSIS — K76.0 FATTY LIVER: ICD-10-CM

## 2024-09-05 DIAGNOSIS — E78.00 HYPERCHOLESTEROLEMIA: ICD-10-CM

## 2024-09-05 DIAGNOSIS — D50.8 IRON DEFICIENCY ANEMIA SECONDARY TO INADEQUATE DIETARY IRON INTAKE: ICD-10-CM

## 2024-09-05 DIAGNOSIS — Z41.1 ENCOUNTER FOR COSMETIC SURGERY: ICD-10-CM

## 2024-09-05 DIAGNOSIS — Z01.818 PREOP EXAM FOR INTERNAL MEDICINE: Primary | ICD-10-CM

## 2024-09-28 ENCOUNTER — LAB ENCOUNTER (OUTPATIENT)
Dept: LAB | Facility: HOSPITAL | Age: 44
End: 2024-09-28
Attending: INTERNAL MEDICINE
Payer: COMMERCIAL

## 2024-09-28 DIAGNOSIS — E78.00 HYPERCHOLESTEROLEMIA: ICD-10-CM

## 2024-09-28 DIAGNOSIS — K76.0 FATTY LIVER: ICD-10-CM

## 2024-09-28 DIAGNOSIS — Z41.1 ENCOUNTER FOR COSMETIC SURGERY: ICD-10-CM

## 2024-09-28 DIAGNOSIS — Z01.818 PREOP EXAM FOR INTERNAL MEDICINE: ICD-10-CM

## 2024-09-28 DIAGNOSIS — D50.8 IRON DEFICIENCY ANEMIA SECONDARY TO INADEQUATE DIETARY IRON INTAKE: ICD-10-CM

## 2024-09-28 LAB
ALBUMIN SERPL-MCNC: 4.1 G/DL (ref 3.2–4.8)
ALBUMIN/GLOB SERPL: 1.4 {RATIO} (ref 1–2)
ALP LIVER SERPL-CCNC: 66 U/L
ALT SERPL-CCNC: 15 U/L
ANION GAP SERPL CALC-SCNC: 7 MMOL/L (ref 0–18)
AST SERPL-CCNC: 14 U/L (ref ?–34)
BASOPHILS # BLD AUTO: 0.05 X10(3) UL (ref 0–0.2)
BASOPHILS NFR BLD AUTO: 0.7 %
BILIRUB SERPL-MCNC: 0.7 MG/DL (ref 0.3–1.2)
BUN BLD-MCNC: 9 MG/DL (ref 9–23)
BUN/CREAT SERPL: 13.2 (ref 10–20)
CALCIUM BLD-MCNC: 9.3 MG/DL (ref 8.7–10.4)
CHLORIDE SERPL-SCNC: 109 MMOL/L (ref 98–112)
CO2 SERPL-SCNC: 25 MMOL/L (ref 21–32)
CREAT BLD-MCNC: 0.68 MG/DL
DEPRECATED RDW RBC AUTO: 39.3 FL (ref 35.1–46.3)
EGFRCR SERPLBLD CKD-EPI 2021: 111 ML/MIN/1.73M2 (ref 60–?)
EOSINOPHIL # BLD AUTO: 0.05 X10(3) UL (ref 0–0.7)
EOSINOPHIL NFR BLD AUTO: 0.7 %
ERYTHROCYTE [DISTWIDTH] IN BLOOD BY AUTOMATED COUNT: 12 % (ref 11–15)
FASTING STATUS PATIENT QL REPORTED: YES
GLOBULIN PLAS-MCNC: 3 G/DL (ref 2–3.5)
GLUCOSE BLD-MCNC: 96 MG/DL (ref 70–99)
HCT VFR BLD AUTO: 39.3 %
HGB BLD-MCNC: 13.3 G/DL
IMM GRANULOCYTES # BLD AUTO: 0.02 X10(3) UL (ref 0–1)
IMM GRANULOCYTES NFR BLD: 0.3 %
INR BLD: 0.95 (ref 0.8–1.2)
LYMPHOCYTES # BLD AUTO: 2.17 X10(3) UL (ref 1–4)
LYMPHOCYTES NFR BLD AUTO: 32.2 %
MCH RBC QN AUTO: 30.2 PG (ref 26–34)
MCHC RBC AUTO-ENTMCNC: 33.8 G/DL (ref 31–37)
MCV RBC AUTO: 89.1 FL
MONOCYTES # BLD AUTO: 0.46 X10(3) UL (ref 0.1–1)
MONOCYTES NFR BLD AUTO: 6.8 %
NEUTROPHILS # BLD AUTO: 3.98 X10 (3) UL (ref 1.5–7.7)
NEUTROPHILS # BLD AUTO: 3.98 X10(3) UL (ref 1.5–7.7)
NEUTROPHILS NFR BLD AUTO: 59.3 %
OSMOLALITY SERPL CALC.SUM OF ELEC: 291 MOSM/KG (ref 275–295)
PLATELET # BLD AUTO: 287 10(3)UL (ref 150–450)
POTASSIUM SERPL-SCNC: 4.3 MMOL/L (ref 3.5–5.1)
PROT SERPL-MCNC: 7.1 G/DL (ref 5.7–8.2)
PROTHROMBIN TIME: 13.2 SECONDS (ref 11.6–14.8)
RBC # BLD AUTO: 4.41 X10(6)UL
SODIUM SERPL-SCNC: 141 MMOL/L (ref 136–145)
WBC # BLD AUTO: 6.7 X10(3) UL (ref 4–11)

## 2024-09-28 PROCEDURE — 36415 COLL VENOUS BLD VENIPUNCTURE: CPT

## 2024-09-28 PROCEDURE — 80053 COMPREHEN METABOLIC PANEL: CPT

## 2024-09-28 PROCEDURE — 85610 PROTHROMBIN TIME: CPT

## 2024-09-28 PROCEDURE — 85025 COMPLETE CBC W/AUTO DIFF WBC: CPT

## 2024-10-09 ENCOUNTER — OFFICE VISIT (OUTPATIENT)
Dept: INTERNAL MEDICINE CLINIC | Facility: CLINIC | Age: 44
End: 2024-10-09
Payer: COMMERCIAL

## 2024-10-09 ENCOUNTER — LAB ENCOUNTER (OUTPATIENT)
Dept: LAB | Age: 44
End: 2024-10-09
Attending: INTERNAL MEDICINE
Payer: COMMERCIAL

## 2024-10-09 VITALS
DIASTOLIC BLOOD PRESSURE: 74 MMHG | HEIGHT: 64 IN | HEART RATE: 81 BPM | WEIGHT: 142 LBS | RESPIRATION RATE: 18 BRPM | BODY MASS INDEX: 24.24 KG/M2 | TEMPERATURE: 98 F | OXYGEN SATURATION: 98 % | SYSTOLIC BLOOD PRESSURE: 116 MMHG

## 2024-10-09 DIAGNOSIS — N95.1 PERIMENOPAUSAL: ICD-10-CM

## 2024-10-09 DIAGNOSIS — D50.8 IRON DEFICIENCY ANEMIA SECONDARY TO INADEQUATE DIETARY IRON INTAKE: ICD-10-CM

## 2024-10-09 DIAGNOSIS — E78.00 HYPERCHOLESTEROLEMIA: Primary | ICD-10-CM

## 2024-10-09 DIAGNOSIS — E78.00 HYPERCHOLESTEROLEMIA: ICD-10-CM

## 2024-10-09 LAB
CHOLEST SERPL-MCNC: 206 MG/DL (ref ?–200)
FASTING PATIENT LIPID ANSWER: NO
HDLC SERPL-MCNC: 42 MG/DL (ref 40–59)
IRON SATN MFR SERPL: 16 %
IRON SERPL-MCNC: 68 UG/DL
LDLC SERPL CALC-MCNC: 137 MG/DL (ref ?–100)
NONHDLC SERPL-MCNC: 164 MG/DL (ref ?–130)
TIBC SERPL-MCNC: 423 UG/DL (ref 250–425)
TRANSFERRIN SERPL-MCNC: 284 MG/DL (ref 250–380)
TRIGL SERPL-MCNC: 152 MG/DL (ref 30–149)
VLDLC SERPL CALC-MCNC: 28 MG/DL (ref 0–30)

## 2024-10-09 PROCEDURE — 83540 ASSAY OF IRON: CPT

## 2024-10-09 PROCEDURE — 84466 ASSAY OF TRANSFERRIN: CPT

## 2024-10-09 PROCEDURE — 99214 OFFICE O/P EST MOD 30 MIN: CPT | Performed by: INTERNAL MEDICINE

## 2024-10-09 PROCEDURE — 36415 COLL VENOUS BLD VENIPUNCTURE: CPT

## 2024-10-09 PROCEDURE — 80061 LIPID PANEL: CPT

## 2024-10-09 NOTE — PROGRESS NOTES
Patient ID: Gianna Kaur is a 43 year old female.  Chief Complaint   Patient presents with    Follow - Up        HISTORY OF PRESENT ILLNESS:   HPI  Patient presents for above.  Here for follow-up.    History of hypercholesterolemia.  She stopped taking her rosuvastatin 5 months ago.  She states she feels fine and does not needed.  No change in diet or lifestyle.    History of iron deficiency anemia.  She did do a CBC recently that was normal.  No signs of bleeding.    Believe she is becoming perimenopausal.  Is having dry skin and losing hair.  Ovulation is still at baseline.  She is up-to-date on her Pap smears.  She would like a hormonal panel.    Review of Systems  Ten point review of systems otherwise negative with the exception of HPI and assessment and plan.    MEDICAL HISTORY:     Past Medical History:    Depression    tx for a few months    Vertigo       History reviewed. No pertinent surgical history.      Current Outpatient Medications:     Ferrous Sulfate (IRON) 325 (65 Fe) MG Oral Tab, iron 325 mg (65 mg iron) tablet, [RxNorm: 478678], Disp: , Rfl:     escitalopram 10 MG Oral Tab, Take 1 tablet (10 mg total) by mouth daily., Disp: 90 tablet, Rfl: 0    rosuvastatin 5 MG Oral Tab, Take 1 tablet (5 mg total) by mouth nightly. (Patient not taking: Reported on 10/9/2024), Disp: 90 tablet, Rfl: 0    Allergies:Allergies[1]    Social History     Socioeconomic History    Marital status: Single     Spouse name: Not on file    Number of children: Not on file    Years of education: Not on file    Highest education level: Not on file   Occupational History    Not on file   Tobacco Use    Smoking status: Never    Smokeless tobacco: Never   Vaping Use    Vaping status: Never Used   Substance and Sexual Activity    Alcohol use: Not Currently     Comment: Socially     Drug use: Never    Sexual activity: Yes     Partners: Male     Birth control/protection: Condom   Other Topics Concern    Not on file    Social History Narrative    Not on file     Social Drivers of Health     Financial Resource Strain: Not on file   Food Insecurity: No Food Insecurity (4/16/2024)    Food Insecurity     Food Insecurity: Never true   Transportation Needs: No Transportation Needs (4/16/2024)    Transportation Needs     Lack of Transportation: No   Physical Activity: Not on file   Stress: Not on file   Social Connections: Not on file   Housing Stability: Low Risk  (4/16/2024)    Housing Stability     Housing Instability: No     Housing Instability Emergency: Not on file     Crib or Bassinette: Not on file           PHYSICAL EXAM:     Vitals:    10/09/24 0846   BP: 116/74   Pulse: 81   Resp: 18   Temp: 98.2 °F (36.8 °C)   TempSrc: Temporal   SpO2: 98%   Weight: 142 lb (64.4 kg)   Height: 5' 4\" (1.626 m)       Body mass index is 24.37 kg/m².    Physical Exam  Constitutional:       Appearance: Normal appearance.   Eyes:      General: No scleral icterus.  Cardiovascular:      Rate and Rhythm: Normal rate and regular rhythm.      Pulses: Normal pulses.      Heart sounds: Normal heart sounds. No murmur heard.  Pulmonary:      Effort: Pulmonary effort is normal. No respiratory distress.      Breath sounds: Normal breath sounds. No stridor. No wheezing or rhonchi.   Abdominal:      General: Abdomen is flat. Bowel sounds are normal.      Palpations: Abdomen is soft.   Neurological:      Mental Status: She is alert.   Psychiatric:         Mood and Affect: Mood normal.         Behavior: Behavior normal.           ASSESSMENT/PLAN:   1. Hypercholesterolemia  Lipid Panel; Future  May need to go back on medications.    2. Iron deficiency anemia secondary to inadequate dietary iron intake  Iron And Tibc [E]; Future    3. Perimenopausal  OBG Referral - [place] ([facility])    Return in about 6 weeks (around 11/20/2024) for preoperative examination..    This note was prepared using Dragon Medical voice recognition dictation software. As a  result errors may occur. When identified these errors have been corrected. While every attempt is made to correct errors during dictation discrepancies may still exist.    Gage Curry MD  10/9/2024       [1] No Known Allergies

## 2024-11-13 ENCOUNTER — PATIENT MESSAGE (OUTPATIENT)
Dept: INTERNAL MEDICINE CLINIC | Facility: CLINIC | Age: 44
End: 2024-11-13

## 2024-11-13 DIAGNOSIS — Z01.818 PREOP EXAM FOR INTERNAL MEDICINE: Primary | ICD-10-CM

## 2024-11-14 ENCOUNTER — OFFICE VISIT (OUTPATIENT)
Dept: OBGYN CLINIC | Facility: CLINIC | Age: 44
End: 2024-11-14
Payer: COMMERCIAL

## 2024-11-14 ENCOUNTER — LAB ENCOUNTER (OUTPATIENT)
Dept: LAB | Facility: HOSPITAL | Age: 44
End: 2024-11-14
Attending: OBSTETRICS & GYNECOLOGY
Payer: COMMERCIAL

## 2024-11-14 VITALS
DIASTOLIC BLOOD PRESSURE: 86 MMHG | BODY MASS INDEX: 24 KG/M2 | HEIGHT: 64 IN | SYSTOLIC BLOOD PRESSURE: 119 MMHG | HEART RATE: 81 BPM

## 2024-11-14 DIAGNOSIS — L85.3 DRY SKIN: ICD-10-CM

## 2024-11-14 DIAGNOSIS — R00.2 PALPITATIONS: ICD-10-CM

## 2024-11-14 DIAGNOSIS — Z01.818 PREOP EXAM FOR INTERNAL MEDICINE: ICD-10-CM

## 2024-11-14 DIAGNOSIS — Z01.419 WELL WOMAN EXAM: Primary | ICD-10-CM

## 2024-11-14 LAB
APTT PPP: 28.4 SECONDS (ref 23–36)
T4 FREE SERPL-MCNC: 1 NG/DL (ref 0.8–1.7)
TSI SER-ACNC: 1.67 UIU/ML (ref 0.55–4.78)

## 2024-11-14 PROCEDURE — 36415 COLL VENOUS BLD VENIPUNCTURE: CPT

## 2024-11-14 PROCEDURE — 85730 THROMBOPLASTIN TIME PARTIAL: CPT

## 2024-11-14 PROCEDURE — 84443 ASSAY THYROID STIM HORMONE: CPT

## 2024-11-14 PROCEDURE — 84439 ASSAY OF FREE THYROXINE: CPT

## 2024-11-18 NOTE — PROGRESS NOTES
Chief Complaint   Patient presents with    Gyn Exam     New patient, annual. Patient states she's having some perimenopausal symptoms such as hair loss, dry skin and palpitations.       HPI:  The patient is a 44 yo F here for WWE.  Cycles monthly with3-5d flow.  Monogamous and not using BC.  Noticing hair loss, dry skin behind ears and palpitations.  Told to see GYN by PCP    Patient's last menstrual period was 2024 (exact date).        Latest Ref Rng & Units 2023     7:16 PM 3/6/2018     2:08 PM   RECENT PAP RESULTS   INTERPRETATION/RESULT: Negative for intraepithelial lesion or malignancy Negative for intraepithelial lesion or malignancy  Negative for intraepithelial lesion or malignancy    HPV Negative Negative  Negative         Hx Prior Abnormal Pap: No  Pap Date: 23  Pap Result Notes: pap/hpv neg  Follow Up Recommendation: mammo 24 bilat neg      Chaperone: RN present for visit      Depression Screening (PHQ-2/PHQ-9): Over the LAST 2 WEEKS   Little interest or pleasure in doing things (over the last two weeks)?: Not at all    Feeling down, depressed, or hopeless (over the last two weeks)?: Not at all    PHQ-2 SCORE: 0          Reviewed medical and surgical history below       OBSTETRICS HISTORY:  OB History    Para Term  AB Living   3 2 2 0 1 2   SAB IAB Ectopic Multiple Live Births   1 0 0 0 2       GYNE HISTORY:  History   Sexual Activity    Sexual activity: Yes    Partners: Male            MEDICAL HISTORY:  Past Medical History:    Depression    tx for a few months    High cholesterol    Vertigo       SURGICAL HISTORY:  Past Surgical History:   Procedure Laterality Date    Cholecystectomy         SOCIAL HISTORY:  Social History     Socioeconomic History    Marital status: Single     Spouse name: Not on file    Number of children: Not on file    Years of education: Not on file    Highest education level: Not on file   Occupational History    Not on file   Tobacco Use     Smoking status: Never    Smokeless tobacco: Never   Vaping Use    Vaping status: Never Used   Substance and Sexual Activity    Alcohol use: Not Currently     Comment: Socially     Drug use: Never    Sexual activity: Yes     Partners: Male   Other Topics Concern    Not on file   Social History Narrative    Not on file     Social Drivers of Health     Financial Resource Strain: Not on file   Food Insecurity: No Food Insecurity (4/16/2024)    Food Insecurity     Food Insecurity: Never true   Transportation Needs: No Transportation Needs (4/16/2024)    Transportation Needs     Lack of Transportation: No   Physical Activity: Not on file   Stress: Not on file   Social Connections: Not on file   Housing Stability: Low Risk  (4/16/2024)    Housing Stability     Housing Instability: No     Housing Instability Emergency: Not on file     Crib or Bassinette: Not on file       FAMILY HISTORY:  Family History   Problem Relation Age of Onset    Other (Other) Mother         Chirosis, Hep B    Cancer Father         prostate    Diabetes Other         maternal side       MEDICATIONS:    Current Outpatient Medications:     Ferrous Sulfate (IRON) 325 (65 Fe) MG Oral Tab, iron 325 mg (65 mg iron) tablet, [RxNorm: 156491], Disp: , Rfl:     escitalopram 10 MG Oral Tab, Take 1 tablet (10 mg total) by mouth daily., Disp: 90 tablet, Rfl: 0    rosuvastatin 5 MG Oral Tab, Take 1 tablet (5 mg total) by mouth nightly., Disp: 90 tablet, Rfl: 0    ALLERGIES:  Allergies[1]      Review of Systems:  Constitutional:  Denies fevers and chills   Cardiovascular:  denies chest pain or palpitations  Respiratory:  denies shortness of breath  Gastrointestinal:  denies heartburn, abdominal pain, diarrhea or constipation  Genitourinary:  denies dysuria, incontinence, abnormal vaginal discharge, vaginal itching  Musculoskeletal:  denies back pain.  Skin/Breast:  Denies any breast pain, lumps, or discharge.   Neurological:  denies headaches, extremity  weakness  Psychiatric: denies depression or anxiety.      Vitals:    11/14/24 0926   BP: 119/86   Pulse: 81       PHYSICAL EXAM:   Constitutional: well developed, well nourished  Head/Face: normocephalic  Neck/Thyroid: thyroid symmetric, no thyromegaly, no nodules, no adenopathy  Heart: Regular rate and rhythm   Lungs: clear to ascultation bilaterally   Lymphatic:no abnormal supraclavicular or axillary adenopathy is noted  Breast: normal without palpable masses, tenderness, asymmetry, nipple discharge, nipple retraction or skin changes  Abdomen:  soft, nontender, nondistended, no masses  Skin/Hair: no unusual rashes or bruises  Extremities: no edema, no cyanosis  Psychiatric: Appropriate mood and affect    Pelvic Exam:  External Genitalia: normal appearance, hair distribution, and no lesions  Urethral Meatus:  normal in size, location, without lesions and prolapse  Bladder:  No fullness, masses or tenderness  Vagina:  Normal appearance without lesions, no abnormal discharge  Cervix:  Normal without tenderness on motion  Uterus: normal in size, contour, position, mobility, without tenderness  Adnexa: normal without masses or tenderness  Perineum: normal    Assessment/Plan:  Gianna was seen today for gyn exam.    Diagnoses and all orders for this visit:    Well woman exam    Dry skin  -     TSH and Free T4; Future    Palpitations  -     TSH and Free T4; Future        WWE:   Reviewed ASCCP guidelines with the patient -- Pap UTD  Contraception: n/a  Discussed check labs as above to r/o thyroid condition given dry skin, hair loss and palpitations.  Although if these labs are negative, could be related to perimenopause but given age, it would be quite early, yet still possible.  We discussed seeing derm to review hair loss and dry skin--information provided.  We also reviewed r/b of BC and HRT.  Given age and being sexually active, I'd recommend we first start of BC.  Will send  with TSH results.  Patient to consider  options  Breast Health:     Mammo UTD--has 6 mo follow up order from PCP  Encouraged monthly self breast exams with the patient   Discussed diet, exercise, MVIs with Vit D  Follow up in 1 yr for WWE           [1] No Known Allergies

## 2024-11-22 ENCOUNTER — PATIENT MESSAGE (OUTPATIENT)
Dept: INTERNAL MEDICINE CLINIC | Facility: CLINIC | Age: 44
End: 2024-11-22

## 2024-11-22 ENCOUNTER — OFFICE VISIT (OUTPATIENT)
Dept: INTERNAL MEDICINE CLINIC | Facility: CLINIC | Age: 44
End: 2024-11-22

## 2024-11-22 VITALS
BODY MASS INDEX: 24.07 KG/M2 | OXYGEN SATURATION: 100 % | RESPIRATION RATE: 16 BRPM | HEIGHT: 64 IN | DIASTOLIC BLOOD PRESSURE: 80 MMHG | TEMPERATURE: 98 F | SYSTOLIC BLOOD PRESSURE: 124 MMHG | WEIGHT: 141 LBS | HEART RATE: 75 BPM

## 2024-11-22 DIAGNOSIS — F41.9 ANXIETY AND DEPRESSION: ICD-10-CM

## 2024-11-22 DIAGNOSIS — Z01.818 PREOP EXAM FOR INTERNAL MEDICINE: Primary | ICD-10-CM

## 2024-11-22 DIAGNOSIS — F32.A ANXIETY AND DEPRESSION: ICD-10-CM

## 2024-11-22 DIAGNOSIS — E78.00 HYPERCHOLESTEROLEMIA: ICD-10-CM

## 2024-11-22 DIAGNOSIS — D50.8 IRON DEFICIENCY ANEMIA SECONDARY TO INADEQUATE DIETARY IRON INTAKE: ICD-10-CM

## 2024-11-22 PROCEDURE — 99214 OFFICE O/P EST MOD 30 MIN: CPT | Performed by: INTERNAL MEDICINE

## 2024-11-22 RX ORDER — ROSUVASTATIN CALCIUM 5 MG/1
5 TABLET, COATED ORAL NIGHTLY
Qty: 90 TABLET | Refills: 3 | Status: SHIPPED | OUTPATIENT
Start: 2024-11-22

## 2024-11-22 NOTE — PROGRESS NOTES
Patient ID: Gianna Kaur is a 43 year old female.  Chief Complaint   Patient presents with    Pre-Op Exam     Patient presents for pre-op for a BBL, will be conducted by  at [facility]. Will be done on 12/16. Will fax to [phone].        HISTORY OF PRESENT ILLNESS:   HPI  Pt presents for preoperative clearance.  Planned surgery - Brazilian butt lift  Type of anesthesia - Unknown  Surgeon - Dr. Ramon Dowell  Date of surgery - 12/16/2024  Cardiac history - No  Myocardial infarction in past 6 months - No  Bleeding disorders - No  Taking blood thinners - No  Chest pain or shortness of breath with ADLs - No  Number of alcoholic beverages consumed weekly - 0  Tobacco consumption - No  Has been cleared by cardiology for this procedure.    Review of Systems  Ten point review of systems otherwise negative with the exception of HPI and assessment and plan.    MEDICAL HISTORY:     Past Medical History:    Depression    tx for a few months    High cholesterol    Vertigo       Past Surgical History:   Procedure Laterality Date    Cholecystectomy           Current Outpatient Medications:     rosuvastatin 5 MG Oral Tab, Take 1 tablet (5 mg total) by mouth nightly., Disp: 90 tablet, Rfl: 3    Ferrous Sulfate (IRON) 325 (65 Fe) MG Oral Tab, iron 325 mg (65 mg iron) tablet, [RxNorm: 032169], Disp: , Rfl:     escitalopram 10 MG Oral Tab, Take 1 tablet (10 mg total) by mouth daily., Disp: 90 tablet, Rfl: 0    Allergies:Allergies[1]    Social History     Socioeconomic History    Marital status: Single     Spouse name: Not on file    Number of children: Not on file    Years of education: Not on file    Highest education level: Not on file   Occupational History    Not on file   Tobacco Use    Smoking status: Never    Smokeless tobacco: Never   Vaping Use    Vaping status: Never Used   Substance and Sexual Activity    Alcohol use: Not Currently     Comment: Socially     Drug use: Never    Sexual  activity: Yes     Partners: Male   Other Topics Concern    Not on file   Social History Narrative    Not on file     Social Drivers of Health     Financial Resource Strain: Not on file   Food Insecurity: No Food Insecurity (4/16/2024)    Food Insecurity     Food Insecurity: Never true   Transportation Needs: No Transportation Needs (4/16/2024)    Transportation Needs     Lack of Transportation: No   Physical Activity: Not on file   Stress: Not on file   Social Connections: Not on file   Housing Stability: Low Risk  (4/16/2024)    Housing Stability     Housing Instability: No     Housing Instability Emergency: Not on file     Crib or Bassinette: Not on file           PHYSICAL EXAM:     Vitals:    11/22/24 0940   BP: 124/80   Pulse: 75   Resp: 16   Temp: 98.4 °F (36.9 °C)   TempSrc: Temporal   SpO2: 100%   Weight: 141 lb (64 kg)   Height: 5' 4\" (1.626 m)       Body mass index is 24.2 kg/m².    Physical Exam  Constitutional:       Appearance: Normal appearance.   Eyes:      General: No scleral icterus.  Cardiovascular:      Rate and Rhythm: Normal rate and regular rhythm.      Pulses: Normal pulses.      Heart sounds: Normal heart sounds. No murmur heard.  Pulmonary:      Effort: Pulmonary effort is normal. No respiratory distress.      Breath sounds: Normal breath sounds. No stridor. No wheezing or rhonchi.   Abdominal:      General: Abdomen is flat. Bowel sounds are normal.      Palpations: Abdomen is soft.   Neurological:      Mental Status: She is alert.   Psychiatric:         Mood and Affect: Mood normal.         Behavior: Behavior normal.           ASSESSMENT/PLAN:   1. Preop exam for internal medicine  Patient cleared by cardiology on 11/12/2024.  Patient is cleared to proceed with surgery without further testing.  Further recommendations to be provided by her surgeon leading into surgery.    2. Hypercholesterolemia  rosuvastatin 5 MG Oral Tab; Take 1 tablet (5 mg total) by mouth nightly.  Dispense: 90 tablet;  Refill: 3  Patient cleared by cardiology on 11/12/2024.  Patient is cleared to proceed with surgery without further testing.  Further recommendations to be provided by her surgeon leading into surgery.    3. Anxiety and depression  Patient cleared by cardiology on 11/12/2024.  Patient is cleared to proceed with surgery without further testing.  Further recommendations to be provided by her surgeon leading into surgery.    4. Iron deficiency anemia secondary to inadequate dietary iron intake  Patient cleared by cardiology on 11/12/2024.  Patient is cleared to proceed with surgery without further testing.  Further recommendations to be provided by her surgeon leading into surgery.    Return if symptoms worsen or fail to improve.    This note was prepared using Dragon Medical voice recognition dictation software. As a result errors may occur. When identified these errors have been corrected. While every attempt is made to correct errors during dictation discrepancies may still exist.    Gage Curry MD  11/22/2024       [1] No Known Allergies

## 2024-12-06 ENCOUNTER — PATIENT MESSAGE (OUTPATIENT)
Dept: INTERNAL MEDICINE CLINIC | Facility: CLINIC | Age: 44
End: 2024-12-06

## 2024-12-06 DIAGNOSIS — L60.0 INGROWN TOENAIL: Primary | ICD-10-CM

## 2025-01-15 ENCOUNTER — OFFICE VISIT (OUTPATIENT)
Dept: PODIATRY CLINIC | Facility: CLINIC | Age: 45
End: 2025-01-15

## 2025-01-15 DIAGNOSIS — L60.0 INGROWING RIGHT GREAT TOENAIL: Primary | ICD-10-CM

## 2025-01-15 DIAGNOSIS — L60.0 INGROWING LEFT GREAT TOENAIL: ICD-10-CM

## 2025-01-15 DIAGNOSIS — M79.675 PAIN IN TOES OF BOTH FEET: ICD-10-CM

## 2025-01-15 DIAGNOSIS — M79.674 PAIN IN TOES OF BOTH FEET: ICD-10-CM

## 2025-01-15 PROCEDURE — 99203 OFFICE O/P NEW LOW 30 MIN: CPT | Performed by: PODIATRIST

## 2025-01-16 NOTE — PROGRESS NOTES
Gianna Kaur is a 44 year old female.   Chief Complaint   Patient presents with    Ingrown Toenail     Bilateral ingrown toe nails, started about 1 month ago.         HPI:   Pleasant patient presents complaining of ingrown toenails on both great toes she points to the medial nail border.  Started about a month ago but she frequently tries trimming them out but has been unable to do especially on the right big toe it is very painful.  She has not gotten any drainage out of it.  At today's visit reviewed nurse's history as taken above, allergies medications and medical history as documented below.  All changes duly noted  Allergies: Patient has no known allergies.   Current Outpatient Medications   Medication Sig Dispense Refill    rosuvastatin 5 MG Oral Tab Take 1 tablet (5 mg total) by mouth nightly. 90 tablet 3    Ferrous Sulfate (IRON) 325 (65 Fe) MG Oral Tab iron 325 mg (65 mg iron) tablet, [RxNorm: 130423]      escitalopram 10 MG Oral Tab Take 1 tablet (10 mg total) by mouth daily. 90 tablet 0      Past Medical History:    Depression    tx for a few months    High cholesterol    Vertigo      Past Surgical History:   Procedure Laterality Date    Cholecystectomy        Family History   Problem Relation Age of Onset    Other (Other) Mother         Chirosis, Hep B    Cancer Father         prostate    Diabetes Other         maternal side      Social History     Socioeconomic History    Marital status: Single   Tobacco Use    Smoking status: Never    Smokeless tobacco: Never   Vaping Use    Vaping status: Never Used   Substance and Sexual Activity    Alcohol use: Not Currently     Comment: Socially     Drug use: Never    Sexual activity: Yes     Partners: Male           REVIEW OF SYSTEMS:   Today reviewed systens as documented below  GENERAL HEALTH: feels well otherwise  SKIN: Refer to exam below  RESPIRATORY: denies shortness of breath with exertion  CARDIOVASCULAR: denies chest pain on exertion  GI:  denies abdominal pain and denies heartburn  NEURO: denies headaches    EXAM:   LMP 11/07/2024 (Exact Date)   GENERAL: well developed, well nourished, in no apparent distress  EXTREMITIES:   1. Integument: The skin on her feet was examined its warm and moist the medial nail border of her hallux is incurvated it is more severe on the right than the left.   2. Vascular: Patient has palpable pulses, bilateral   3. Neurologic: Has intact sensorium bilateral   4. Musculoskeletal: Patient has good muscle strength there are no deficits noted on both extremities    ASSESSMENT AND PLAN:   Diagnoses and all orders for this visit:    Ingrowing right great toenail    Ingrowing left great toenail    Pain in toes of both feet        Plan: At today's visit recommend that we correct these permanently because of the severity of the incurvation this could be done with a phenol and alcohol technique should not want to do it tonight but will reschedule to have that done.  Using a slant back technique trimmed out debrided the medial nail border of the right hallux used a curette to remove hyperkeratotic impactions.    The patient indicates understanding of these issues and agrees to the plan.    Kali Rivers DPM

## 2025-02-17 ENCOUNTER — OFFICE VISIT (OUTPATIENT)
Dept: PODIATRY CLINIC | Facility: CLINIC | Age: 45
End: 2025-02-17
Payer: COMMERCIAL

## 2025-02-17 VITALS
WEIGHT: 141 LBS | BODY MASS INDEX: 24.07 KG/M2 | DIASTOLIC BLOOD PRESSURE: 80 MMHG | SYSTOLIC BLOOD PRESSURE: 124 MMHG | HEIGHT: 64 IN

## 2025-02-17 DIAGNOSIS — M79.675 PAIN IN TOES OF BOTH FEET: ICD-10-CM

## 2025-02-17 DIAGNOSIS — L60.0 INGROWING RIGHT GREAT TOENAIL: Primary | ICD-10-CM

## 2025-02-17 DIAGNOSIS — M79.674 PAIN IN TOES OF BOTH FEET: ICD-10-CM

## 2025-02-17 RX ORDER — MUPIROCIN 20 MG/G
OINTMENT TOPICAL
Qty: 30 G | Refills: 0 | Status: SHIPPED | OUTPATIENT
Start: 2025-02-17

## 2025-02-17 NOTE — PROGRESS NOTES
Per Dr. Rivers verbal order, to draw up 5ml of 0.5% Marcaine for right ingrown toenail procedure.

## 2025-02-17 NOTE — PROGRESS NOTES
Gianna Kaur is a 44 year old female.   Chief Complaint   Patient presents with    Ingrown Toenail     Patient is here due to an ingrown nail on her right great toe - pain rated 3/10 with pressure - no redness - no drainage          HPI:   Patient returns to the clinic to have her nails done she points to the medial nail edge of her right great toe.  At today's visit reviewed nurse's history as taken above, allergies medications and medical history as documented below.  All changes duly noted  Allergies: Patient has no known allergies.   Current Outpatient Medications   Medication Sig Dispense Refill    rosuvastatin 5 MG Oral Tab Take 1 tablet (5 mg total) by mouth nightly. 90 tablet 3    Ferrous Sulfate (IRON) 325 (65 Fe) MG Oral Tab iron 325 mg (65 mg iron) tablet, [RxNorm: 308403]      escitalopram 10 MG Oral Tab Take 1 tablet (10 mg total) by mouth daily. 90 tablet 0      Past Medical History:    Depression    tx for a few months    High cholesterol    Vertigo      Past Surgical History:   Procedure Laterality Date    Cholecystectomy        Family History   Problem Relation Age of Onset    Other (Other) Mother         Chirosis, Hep B    Cancer Father         prostate    Diabetes Other         maternal side      Social History     Socioeconomic History    Marital status: Single   Tobacco Use    Smoking status: Never    Smokeless tobacco: Never   Vaping Use    Vaping status: Never Used   Substance and Sexual Activity    Alcohol use: Not Currently     Comment: Socially     Drug use: Never    Sexual activity: Yes     Partners: Male           REVIEW OF SYSTEMS:   Today reviewed systens as documented below  GENERAL HEALTH: feels well otherwise  SKIN: Refer to exam below  RESPIRATORY: denies shortness of breath with exertion  CARDIOVASCULAR: denies chest pain on exertion  GI: denies abdominal pain and denies heartburn  NEURO: denies headaches    EXAM:   Ht 5' 4\" (1.626 m)   Wt 141 lb (64 kg)   LMP  11/07/2024 (Exact Date)   BMI 24.20 kg/m²   GENERAL: well developed, well nourished, in no apparent distress  EXTREMITIES:   Patient's neurovascular status is intact no changes are noted.  Today I consented the patient for a phenol and alcohol partial onychoplasty on the medial nail border of her right great toe.  I described the procedure in detail reviewed possible risks and complications with no guarantees or assurances being offered and I discussed with her the aftercare and soaking necessity.  Questions were answered the patient wished to proceed and the consent was signed.    ASSESSMENT AND PLAN:   Diagnoses and all orders for this visit:    Ingrowing right great toenail    Pain in toes of both feet        Plan:   Preprocedure diagnosis: Painful recurrent ingrowing toenail medial nail border of the right hallux  Post procedure diagnosis: Same  Procedure: Partial onychoplasty utilizing a phenol and alcohol technique medial nail border of the right hallux    Technique: Appropriate timeout was taken.  The right hallux was anesthetized with 5 cc of 0.5% Marcaine plain then prepped and draped using usual aseptic technique.  Hemostasis was achieved at the base of the toe with a toe tourniquet.  The medial nail border of the right hallux was removed using appropriate technique.  The nail matrix area was curetted to the see if there were any remaining spicules and none were noted.  The nail matrix then received 2 consecutive, 60 seconds, applications of full strength phenol using Pedinol phenol sticks.  All areas that received phenol were then flushed with copious amounts of alcohol.  The affected toes were dressed with antibiotic ointment gauze and a lightly applied Coban wrap for compression.  Patient was placed on antibiotics for period of 1 week will begin warm soapy soaks tomorrow instructions dispensed follow-up in 2 week    The patient indicates understanding of these issues and agrees to the plan.    Kali  HARPREET Rivers DPM

## 2025-03-03 ENCOUNTER — OFFICE VISIT (OUTPATIENT)
Dept: PODIATRY CLINIC | Facility: CLINIC | Age: 45
End: 2025-03-03

## 2025-03-03 DIAGNOSIS — Z98.890 STATUS POST NAIL SURGERY: Primary | ICD-10-CM

## 2025-03-03 PROCEDURE — 99213 OFFICE O/P EST LOW 20 MIN: CPT | Performed by: PODIATRIST

## 2025-03-05 NOTE — PROGRESS NOTES
Gianna Kaur is a 44 year old female.   Chief Complaint   Patient presents with    Follow - Up     Right hallux- patient denies pain          HPI:   Patient returns to the clinic for checkup on her right great toe she is not having any pain.  At today's visit reviewed nurse's history as taken above, allergies medications and medical history as documented below.  All changes duly noted  Allergies: Patient has no known allergies.   Current Outpatient Medications   Medication Sig Dispense Refill    amoxicillin clavulanate 875-125 MG Oral Tab Take 1 tablet by mouth 2 (two) times daily. 30 tablet 0    mupirocin 2 % External Ointment Apply a small amount to the affected nail edge twice daily after soaking and cover with a Band-Aid, 30 g 0    rosuvastatin 5 MG Oral Tab Take 1 tablet (5 mg total) by mouth nightly. 90 tablet 3    Ferrous Sulfate (IRON) 325 (65 Fe) MG Oral Tab iron 325 mg (65 mg iron) tablet, [RxNorm: 628898]      escitalopram 10 MG Oral Tab Take 1 tablet (10 mg total) by mouth daily. 90 tablet 0      Past Medical History:    Depression    tx for a few months    High cholesterol    Vertigo      Past Surgical History:   Procedure Laterality Date    Cholecystectomy        Family History   Problem Relation Age of Onset    Other (Other) Mother         Chirosis, Hep B    Cancer Father         prostate    Diabetes Other         maternal side      Social History     Socioeconomic History    Marital status: Single   Tobacco Use    Smoking status: Never    Smokeless tobacco: Never   Vaping Use    Vaping status: Never Used   Substance and Sexual Activity    Alcohol use: Not Currently     Comment: Socially     Drug use: Never    Sexual activity: Yes     Partners: Male           REVIEW OF SYSTEMS:   Today reviewed systens as documented below  GENERAL HEALTH: feels well otherwise  SKIN: Refer to exam below  RESPIRATORY: denies shortness of breath with exertion  CARDIOVASCULAR: denies chest pain on  exertion  GI: denies abdominal pain and denies heartburn  NEURO: denies headaches    EXAM:   LMP 11/07/2024 (Exact Date)   GENERAL: well developed, well nourished, in no apparent distress  EXTREMITIES:   Border the right hallux is healing well.  There is no sign of infection mild drainage still present.  I reassured the patient this was normal.    ASSESSMENT AND PLAN:   Diagnoses and all orders for this visit:    Status post nail surgery        Plan: Patient will wash it once daily and soak it once daily applying antibiotic ointment and a Band-Aid until all drainage stops she will return to the clinic only if she is having continued problems.    The patient indicates understanding of these issues and agrees to the plan.    Kali Rivers DPM

## 2025-04-28 ENCOUNTER — MED REC SCAN ONLY (OUTPATIENT)
Dept: INTERNAL MEDICINE CLINIC | Facility: CLINIC | Age: 45
End: 2025-04-28

## 2025-04-28 ENCOUNTER — DOCUMENTATION ONLY (OUTPATIENT)
Dept: INTERNAL MEDICINE CLINIC | Facility: CLINIC | Age: 45
End: 2025-04-28

## 2025-04-28 NOTE — PROGRESS NOTES
[facility] letter of courtesy stating they are unable to reach the patient has been submitted for scanning.

## (undated) DIAGNOSIS — R79.89 LOW VITAMIN D LEVEL: Primary | ICD-10-CM

## (undated) DIAGNOSIS — R92.8 ABNORMAL MAMMOGRAM: Primary | ICD-10-CM

## (undated) DEVICE — GAMMEX® PI HYBRID SIZE 7, STERILE POWDER-FREE SURGICAL GLOVE, POLYISOPRENE AND NEOPRENE BLEND: Brand: GAMMEX

## (undated) DEVICE — Device: Brand: SUTURE PASSOR PRO

## (undated) DEVICE — SUT VCRL 0 L27IN ABSRB VLT L26MM UR-6 5/8

## (undated) DEVICE — LAP CHOLE: Brand: MEDLINE INDUSTRIES, INC.

## (undated) DEVICE — GAUZE SPONGES,8 PLY: Brand: CURITY

## (undated) DEVICE — SUT POLYSRB 0 60IN ABSRB UD POLY BRD

## (undated) DEVICE — TROCAR: Brand: KII FIOS FIRST ENTRY

## (undated) DEVICE — CLIP APPLIER: Brand: ENDO CLIP II

## (undated) DEVICE — TROCAR: Brand: KII® SLEEVE

## (undated) DEVICE — SOLUTION IV 1000ML 0.9% NACL PRESERVATIVE

## (undated) DEVICE — TRAY CATH FOLEY 16FR INCLUDE BARDX IC COMPLT CARE

## (undated) DEVICE — [HIGH FLOW INSUFFLATOR,  DO NOT USE IF PACKAGE IS DAMAGED,  KEEP DRY,  KEEP AWAY FROM SUNLIGHT,  PROTECT FROM HEAT AND RADIOACTIVE SOURCES.]: Brand: PNEUMOSURE

## (undated) DEVICE — 3M™ TEGADERM™ HP TRANSPARENT FILM DRESSING FRAME STYLE, 9534HP, 2-3/8 X 2-3/4 IN (6 CM X 7 CM), 100/CT 4CT/CASE: Brand: 3M™ TEGADERM™

## (undated) DEVICE — TROCARS: Brand: KII® BLUNT TIP ACCESS SYSTEM

## (undated) DEVICE — Device: Brand: JELCO

## (undated) DEVICE — SOLUTION IRRIG 1000ML 0.9% NACL USP BTL

## (undated) DEVICE — TISSUE RETRIEVAL SYSTEM: Brand: INZII RETRIEVAL SYSTEM

## (undated) DEVICE — TROCAR: Brand: KII SHIELDED BLADED ACCESS SYSTEM

## (undated) DEVICE — SUT MCRYL 3-0 18IN PS-2 ABSRB UD 19MM 3/8 CIR

## (undated) DEVICE — INSUFFLATION NEEDLE TO ESTABLISH PNEUMOPERITONEUM.: Brand: INSUFFLATION NEEDLE

## (undated) NOTE — Clinical Note
Please contact patient and help her set up a balance study and hearing test to be performed in the near future.

## (undated) NOTE — MR AVS SNAPSHOT
[facility]  [address] [place] [address] [phone]               Thank you for choosing us for your health care visit with Nurse. We are glad to serve you and happy to provide you with this summary of your visit.   Please help u Please order the HIV AG AB Combo test ([id number]). Assoc Dx: Other normal pregnancy, not first, first trimester [Z34.81]           Glucose 1 HR OB    Complete by:  As directed    Assoc Dx:   Other normal pregnancy, not first, first trimester [Z34.81]

## (undated) NOTE — MR AVS SNAPSHOT
[address] [facility]  [address] [place] [address]  [phone]               Thank you for choosing us for your health care visit with DEVON Long. We are glad to serve you and happy to provide you with this summary of your visit. information, go to [email or web address] and click on the Sign Up Now link in the [employer] box. Enter your [email or web address] Activation Code exactly as it appears below along with your Zip Code and Date of Birth to complete the sign-up process.  If you do

## (undated) NOTE — IP AVS SNAPSHOT
[facility]            (For Outpatient Use Only) Initial Admit Date: 9/8/2017   Inpt/Obs Admit Date: Inpt: N/A / Obs: N/A   Discharge Date:    Hospital Acct:  [de-identified]   MRN: [de-identified]   CSN: [id number]        ENCOUNTER  Patient Class: OUT September 8, 2017

## (undated) NOTE — IP AVS SNAPSHOT
Patient Demographics     Address  [address] [place] [address] Phone  [phone] [facility])  [phone] (Work)  [phone] [facility]      Emergency Contact(s)     Name Relation Home Work [address] [place] [address]  Spouse [phone] No notes of this type exist for this encounter.             Immunizations     Name Date      RHO(D) Immune Globulin 06/14/17       Future Appointments        Provider Holley Padilla    9/23/2017 11:00 AM Candiec Loya MD [address] [facility]

## (undated) NOTE — LETTER
AUTHORIZATION FOR SURGICAL OPERATION OR OTHER PROCEDURE    1. I hereby authorize Dr.William Rivers, and [facility] staff assigned to my case to perform the following operation and/or procedure at the [facility] site:    _______________________________________________________________________________________________    Right great toe ingrown nail avulsion   _______________________________________________________________________________________________    2.  My physician has explained the nature and purpose of the operation or other procedure, possible alternative methods of treatment, the risks involved, and the possibility of complication to me.  I acknowledge that no guarantee has been made as to the result that may be obtained.  3.  I recognize that, during the course of this operation, or other procedure, unforseen conditions may necessitate additional or different procedure than those listed above.  I, therefore, further authorize and request that the above named physician, his/her physician assistants or designees perform such procedures as are, in his/her professional opinion, necessary and desirable.  4.  Any tissue or organs removed in the operation or other procedure may be disposed of by and at the discretion of the [facility] and [facility].  5.  I understand that in the event of a medical emergency, I will be transported by local paramedics to [facility] or other hospital emergency department.  6.  I certify that I have read and fully understand the above consent to operation and/or other procedure.    7.  I acknowledge that my physician has explained sedation/analgesia administration to me including the risks and benefits.  I consent to the administration of sedation/analgesia as may be necessary or desirable in the judgement of my physician.    Witness signature: ___________________________________________________ Date:   ______/______/_____                    Time:  ________ A.M.  P.M.       Patient Name:  ______________________________________________________  (please print)      Patient signature:  ___________________________________________________             Relationship to Patient:           []  Parent    Responsible person                          []  Spouse  In case of minor or                    [] Other  _____________   Incompetent name:  __________________________________________________                               (please print)      _____________      Responsible person  In case of minor or  Incompetent signature:  _______________________________________________    Statement of Physician  My signature below affirms that prior to the time of the procedure, I have explained to the patient and/or his/her guardian, the risks and benefits involved in the proposed treatment and any reasonable alternative to the proposed treatment.  I have also explained the risks and benefits involved in the refusal of the proposed treatment and have answered the patient's questions.                        Date:  ______/______/_______  Provider                      Signature:  __________________________________________________________       Time:  ___________ A.M    P.M.

## (undated) NOTE — LETTER
AUTHORIZATION FOR SURGICAL OPERATION OR OTHER PROCEDURE    1. I hereby authorize Dr. Kali Rivers, and [facility] staff assigned to my case to perform the following operation and/or procedure at the [facility] site:    _______________________________________________________________________________________________    Partial Nail Avulsion Medial Border Right Halux  _______________________________________________________________________________________________    2.  My physician has explained the nature and purpose of the operation or other procedure, possible alternative methods of treatment, the risks involved, and the possibility of complication to me.  I acknowledge that no guarantee has been made as to the result that may be obtained.  3.  I recognize that, during the course of this operation, or other procedure, unforseen conditions may necessitate additional or different procedure than those listed above.  I, therefore, further authorize and request that the above named physician, his/her physician assistants or designees perform such procedures as are, in his/her professional opinion, necessary and desirable.  4.  Any tissue or organs removed in the operation or other procedure may be disposed of by and at the discretion of the [facility] and [facility].  5.  I understand that in the event of a medical emergency, I will be transported by local paramedics to [facility] or other hospital emergency department.  6.  I certify that I have read and fully understand the above consent to operation and/or other procedure.    7.  I acknowledge that my physician has explained sedation/analgesia administration to me including the risks and benefits.  I consent to the administration of sedation/analgesia as may be necessary or desirable in the judgement of my physician.    Witness signature: ___________________________________________________ Date:   ______/______/_____                    Time:  ________ A.M.  P.M.       Patient Name:  ______________________________________________________  (please print)      Patient signature:  ___________________________________________________             Relationship to Patient:           []  Parent    Responsible person                          []  Spouse  In case of minor or                    [] Other  _____________   Incompetent name:  __________________________________________________                               (please print)      _____________      Responsible person  In case of minor or  Incompetent signature:  _______________________________________________    Statement of Physician  My signature below affirms that prior to the time of the procedure, I have explained to the patient and/or his/her guardian, the risks and benefits involved in the proposed treatment and any reasonable alternative to the proposed treatment.  I have also explained the risks and benefits involved in the refusal of the proposed treatment and have answered the patient's questions.                        Date:  ______/______/_______  Provider                      Signature:  __________________________________________________________       Time:  ___________ A.M    P.M.

## (undated) NOTE — LETTER
[facility]  [address], [place], [place]  Authorization for Surgical Operation and Procedure                                                                                           I hereby authorize Tj Staples MD, my physician and his/her assistants (if applicable), which may include medical students, residents, and/or fellows, to perform the following surgical operation/ procedure and administer such anesthesia as may be determined necessary by my physician: Operation/Procedure name (s) LAPAROSCOPIC CHOLECYSTECTOMY on Gianna Kaur   2.   I recognize that during the surgical operation/procedure, unforeseen conditions may necessitate additional or different procedures than those listed above.  I, therefore, further authorize and request that the above-named surgeon, assistants, or designees perform such procedures as are, in their judgment, necessary and desirable.    3.   My surgeon/physician has discussed prior to my surgery the potential benefits, risks and side effects of this procedure; the likelihood of achieving goals; and potential problems that might occur during recuperation.  They also discussed reasonable alternatives to the procedure, including risks, benefits, and side effects related to the alternatives and risks related to not receiving this procedure.  I have had all my questions answered and I acknowledge that no guarantee has been made as to the result that may be obtained.    4.   Should the need arise during my operation/procedure, which includes change of level of care prior to discharge, I also consent to the administration of blood and/or blood products.  Further, I understand that despite careful testing and screening of blood or blood products by collecting agencies, I may still be subject to ill effects as a result of receiving a blood transfusion and/or blood products.  The following are some, but not all, of the potential risks that can occur:  fever and allergic reactions, hemolytic reactions, transmission of diseases such as Hepatitis, AIDS and Cytomegalovirus (CMV) and fluid overload.  In the event that I wish to have an autologous transfusion of my own blood, or a directed donor transfusion, I will discuss this with my physician.  Check only if Refusing Blood or Blood Products  I understand refusal of blood or blood products as deemed necessary by my physician may have serious consequences to my condition to include possible death. I hereby assume responsibility for my refusal and release the hospital, its personnel, and my physicians from any responsibility for the consequences of my refusal.    o  Refuse   5.   I authorize the use of any specimen, organs, tissues, body parts or foreign objects that may be removed from my body during the operation/procedure for diagnosis, research or teaching purposes and their subsequent disposal by hospital authorities.  I also authorize the release of specimen test results and/or written reports to my treating physician on the hospital medical staff or other referring or consulting physicians involved in my care, at the discretion of the Pathologist or my treating physician.    6.   I consent to the photographing or videotaping of the operations or procedures to be performed, including appropriate portions of my body for medical, scientific, or educational purposes, provided my identity is not revealed by the pictures or by descriptive texts accompanying them.  If the procedure has been photographed/videotaped, the surgeon will obtain the original picture, image, videotape or CD.  The hospital will not be responsible for storage, release or maintenance of the picture, image, tape or CD.    7.   I consent to the presence of a  or observers in the operating room as deemed necessary by my physician or their designees.    8.   I recognize that in the event my procedure results in extended  X-Ray/fluoroscopy time, I may develop a skin reaction.    9. If I have a Do Not Attempt Resuscitation (DNAR) order in place, that status will be suspended while in the operating room, procedural suite, and during the recovery period unless otherwise explicitly stated by me (or a person authorized to consent on my behalf). The surgeon or my attending physician will determine when the applicable recovery period ends for purposes of reinstating the DNAR order.  10. Patients having a sterilization procedure: I understand that if the procedure is successful the results will be permanent and it will therefore be impossible for me to inseminate, conceive, or bear children.  I also understand that the procedure is intended to result in sterility, although the result has not been guaranteed.   11. I acknowledge that my physician has explained sedation/analgesia administration to me including the risk and benefits I consent to the administration of sedation/analgesia as may be necessary or desirable in the judgment of my physician.    I CERTIFY THAT I HAVE READ AND FULLY UNDERSTAND THE ABOVE CONSENT TO OPERATION and/or OTHER PROCEDURE.     _________________________________________ _________________________________     ___________________________________  Signature of Patient     Signature of Responsible Person                   Printed Name of Responsible Person                              _________________________________________ ______________________________        ___________________________________  Signature of Witness         Date  Time         Relationship to Patient    STATEMENT OF PHYSICIAN My signature below affirms that prior to the time of the procedure; I have explained to the patient and/or his/her legal representative, the risks and benefits involved in the proposed treatment and any reasonable alternative to the proposed treatment. I have also explained the risks and benefits involved in refusal of the  proposed treatment and alternatives to the proposed treatment and have answered the patient's questions. If I have a significant financial interest in a co-management agreement or a significant financial interest in any product or implant, or other significant relationship used in this procedure/surgery, I have disclosed this and had a discussion with my patient.     _______________________________________________________________ _____________________________  (Signature of Physician)                                                                                         (Date)                                   (Time)  Patient Name: Gianna Block Michael Coronado    : 1980   Printed: 2024      Medical Record #: [id number]                                              Page 1 of 1

## (undated) NOTE — MR AVS SNAPSHOT
[address] [facility]  [address] [place] [address] [phone]               Thank you for choosing us for your health care visit with Nurse. We are glad to serve you and happy to provide you with this summary of your visit.   Please help u amniocentesis, a prenatal test; or if you have vaginal bleeding earlier than 28 weeks. Treatment is an injection of a medicine called RhoGam or Rhophylac. RhoGam or Rhophylac prevents Rh antibodies from forming. It won’t harm you or the fetus.  After you gi folic acid 1 MG Tabs   TK 1 T PO D   Commonly known as:  FOLVITE           Vitamin D3 05133 units Caps   TK ONE C PO Q 7 DAYS                   Immunizations Administered in the Office Today     RHO(D) Immune Globulin       MyChart     Sign up for MyChart

## (undated) NOTE — MR AVS SNAPSHOT
[facility]  [address] [place] [address] [phone]               Thank you for choosing us for your health care visit with Geena Castano.  Lizett Martinez MD.  We are glad to serve you and happy to provide you with this summary of your vis acquired. Please contact the Patient Business Office at [phone] if you have any questions related to insurance coverage.          Reason for Today's Visit     Gyn Exam           Medical Issues Discussed Today     Missed menses    -  Primary    Vaginal [email or web address]

## (undated) NOTE — LETTER
05/25/17        Gianna [address] [facility]      Dear Jose Bloodgood records indicate that you have outstanding lab work and or testing that was ordered for you and has not yet been completed:  Roma Block